# Patient Record
Sex: FEMALE | Race: WHITE | NOT HISPANIC OR LATINO | Employment: FULL TIME | URBAN - METROPOLITAN AREA
[De-identification: names, ages, dates, MRNs, and addresses within clinical notes are randomized per-mention and may not be internally consistent; named-entity substitution may affect disease eponyms.]

---

## 2018-10-29 ENCOUNTER — OFFICE VISIT CONVERTED (OUTPATIENT)
Dept: ORTHOPEDIC SURGERY | Facility: CLINIC | Age: 48
End: 2018-10-29
Attending: ORTHOPAEDIC SURGERY

## 2018-11-26 ENCOUNTER — CONVERSION ENCOUNTER (OUTPATIENT)
Dept: ORTHOPEDIC SURGERY | Facility: CLINIC | Age: 48
End: 2018-11-26

## 2018-11-26 ENCOUNTER — OFFICE VISIT CONVERTED (OUTPATIENT)
Dept: ORTHOPEDIC SURGERY | Facility: CLINIC | Age: 48
End: 2018-11-26
Attending: PHYSICIAN ASSISTANT

## 2018-12-07 ENCOUNTER — OFFICE VISIT CONVERTED (OUTPATIENT)
Dept: ORTHOPEDIC SURGERY | Facility: CLINIC | Age: 48
End: 2018-12-07
Attending: PHYSICIAN ASSISTANT

## 2019-01-28 ENCOUNTER — OFFICE VISIT CONVERTED (OUTPATIENT)
Dept: ORTHOPEDIC SURGERY | Facility: CLINIC | Age: 49
End: 2019-01-28
Attending: PHYSICIAN ASSISTANT

## 2019-07-03 ENCOUNTER — HOSPITAL ENCOUNTER (OUTPATIENT)
Dept: OTHER | Facility: HOSPITAL | Age: 49
Discharge: HOME | End: 2019-07-03
Attending: INTERNAL MEDICINE

## 2019-07-03 ENCOUNTER — CONVERSION ENCOUNTER (OUTPATIENT)
Dept: INTERNAL MEDICINE | Facility: CLINIC | Age: 49
End: 2019-07-03

## 2019-07-03 ENCOUNTER — OFFICE VISIT CONVERTED (OUTPATIENT)
Dept: INTERNAL MEDICINE | Facility: CLINIC | Age: 49
End: 2019-07-03
Attending: INTERNAL MEDICINE

## 2019-07-03 LAB
ALBUMIN SERPL-MCNC: 4.3 G/DL (ref 3.5–5)
ALBUMIN/GLOB SERPL: 1.7 {RATIO} (ref 1.4–2.6)
ALP SERPL-CCNC: 54 U/L (ref 42–98)
ALT SERPL-CCNC: 22 U/L (ref 10–40)
ANION GAP SERPL CALC-SCNC: 17 MMOL/L (ref 8–19)
AST SERPL-CCNC: 25 U/L (ref 15–50)
BASOPHILS # BLD AUTO: 0.04 10*3/UL (ref 0–0.2)
BASOPHILS NFR BLD AUTO: 0.9 % (ref 0–3)
BILIRUB SERPL-MCNC: 0.55 MG/DL (ref 0.2–1.3)
BUN SERPL-MCNC: 17 MG/DL (ref 5–25)
BUN/CREAT SERPL: 20 {RATIO} (ref 6–20)
CALCIUM SERPL-MCNC: 9.3 MG/DL (ref 8.7–10.4)
CHLORIDE SERPL-SCNC: 103 MMOL/L (ref 99–111)
CHOLEST SERPL-MCNC: 169 MG/DL (ref 107–200)
CHOLEST/HDLC SERPL: 2.6 {RATIO} (ref 3–6)
CONV ABS IMM GRAN: 0.01 10*3/UL (ref 0–0.2)
CONV CO2: 24 MMOL/L (ref 22–32)
CONV IMMATURE GRAN: 0.2 % (ref 0–1.8)
CONV TOTAL PROTEIN: 6.8 G/DL (ref 6.3–8.2)
CREAT UR-MCNC: 0.83 MG/DL (ref 0.5–0.9)
DEPRECATED RDW RBC AUTO: 49.5 FL (ref 36.4–46.3)
EOSINOPHIL # BLD AUTO: 0.08 10*3/UL (ref 0–0.7)
EOSINOPHIL # BLD AUTO: 1.7 % (ref 0–7)
ERYTHROCYTE [DISTWIDTH] IN BLOOD BY AUTOMATED COUNT: 13.4 % (ref 11.7–14.4)
GFR SERPLBLD BASED ON 1.73 SQ M-ARVRAT: >60 ML/MIN/{1.73_M2}
GLOBULIN UR ELPH-MCNC: 2.5 G/DL (ref 2–3.5)
GLUCOSE SERPL-MCNC: 92 MG/DL (ref 65–99)
HBA1C MFR BLD: 13.4 G/DL (ref 12–16)
HCT VFR BLD AUTO: 41.9 % (ref 37–47)
HDLC SERPL-MCNC: 65 MG/DL (ref 40–60)
LDLC SERPL CALC-MCNC: 93 MG/DL (ref 70–100)
LYMPHOCYTES # BLD AUTO: 1.83 10*3/UL (ref 1–5)
MCH RBC QN AUTO: 32.1 PG (ref 27–31)
MCHC RBC AUTO-ENTMCNC: 32 G/DL (ref 33–37)
MCV RBC AUTO: 100.2 FL (ref 81–99)
MONOCYTES # BLD AUTO: 0.4 10*3/UL (ref 0.2–1.2)
MONOCYTES NFR BLD AUTO: 8.6 % (ref 3–10)
NEUTROPHILS # BLD AUTO: 2.31 10*3/UL (ref 2–8)
NEUTROPHILS NFR BLD AUTO: 49.4 % (ref 30–85)
NRBC CBCN: 0 % (ref 0–0.7)
OSMOLALITY SERPL CALC.SUM OF ELEC: 291 MOSM/KG (ref 273–304)
PLATELET # BLD AUTO: 256 10*3/UL (ref 130–400)
PMV BLD AUTO: 9.6 FL (ref 9.4–12.3)
POTASSIUM SERPL-SCNC: 4.2 MMOL/L (ref 3.5–5.3)
RBC # BLD AUTO: 4.18 10*6/UL (ref 4.2–5.4)
SODIUM SERPL-SCNC: 140 MMOL/L (ref 135–147)
TRIGL SERPL-MCNC: 56 MG/DL (ref 40–150)
TSH SERPL-ACNC: 1.49 M[IU]/L (ref 0.27–4.2)
VARIANT LYMPHS NFR BLD MANUAL: 39.2 % (ref 20–45)
VLDLC SERPL-MCNC: 11 MG/DL (ref 5–37)
WBC # BLD AUTO: 4.67 10*3/UL (ref 4.8–10.8)

## 2019-08-22 ENCOUNTER — HOSPITAL ENCOUNTER (OUTPATIENT)
Dept: GENERAL RADIOLOGY | Facility: HOSPITAL | Age: 49
Discharge: HOME | End: 2019-08-22
Attending: INTERNAL MEDICINE

## 2020-08-26 ENCOUNTER — HOSPITAL ENCOUNTER (OUTPATIENT)
Dept: GENERAL RADIOLOGY | Facility: HOSPITAL | Age: 50
Discharge: HOME | End: 2020-08-26
Attending: OBSTETRICS & GYNECOLOGY

## 2020-08-26 ENCOUNTER — OFFICE VISIT CONVERTED (OUTPATIENT)
Dept: ORTHOPEDIC SURGERY | Facility: CLINIC | Age: 50
End: 2020-08-26
Attending: ORTHOPAEDIC SURGERY

## 2020-08-27 ENCOUNTER — HOSPITAL ENCOUNTER (OUTPATIENT)
Dept: MRI IMAGING | Facility: HOSPITAL | Age: 50
Discharge: HOME | End: 2020-08-27
Attending: ORTHOPAEDIC SURGERY

## 2020-09-02 ENCOUNTER — OFFICE VISIT CONVERTED (OUTPATIENT)
Dept: ORTHOPEDIC SURGERY | Facility: CLINIC | Age: 50
End: 2020-09-02
Attending: ORTHOPAEDIC SURGERY

## 2020-09-17 ENCOUNTER — OFFICE VISIT (OUTPATIENT)
Dept: ORTHOPEDIC SURGERY | Facility: CLINIC | Age: 50
End: 2020-09-17

## 2020-09-17 VITALS — WEIGHT: 170.1 LBS | BODY MASS INDEX: 28.34 KG/M2 | TEMPERATURE: 98 F | HEIGHT: 65 IN

## 2020-09-17 DIAGNOSIS — S83.242A OTHER TEAR OF MEDIAL MENISCUS OF LEFT KNEE AS CURRENT INJURY, INITIAL ENCOUNTER: ICD-10-CM

## 2020-09-17 DIAGNOSIS — M25.562 LEFT KNEE PAIN, UNSPECIFIED CHRONICITY: Primary | ICD-10-CM

## 2020-09-17 PROCEDURE — 99204 OFFICE O/P NEW MOD 45 MIN: CPT | Performed by: ORTHOPAEDIC SURGERY

## 2020-09-17 RX ORDER — CEFAZOLIN SODIUM 2 G/100ML
2 INJECTION, SOLUTION INTRAVENOUS ONCE
Status: CANCELLED | OUTPATIENT
Start: 2020-10-05 | End: 2020-09-17

## 2020-09-22 PROBLEM — S83.242A TEAR OF MEDIAL MENISCUS OF LEFT KNEE, CURRENT: Status: ACTIVE | Noted: 2020-09-22

## 2020-09-24 ENCOUNTER — TRANSCRIBE ORDERS (OUTPATIENT)
Dept: PREADMISSION TESTING | Facility: HOSPITAL | Age: 50
End: 2020-09-24

## 2020-09-24 DIAGNOSIS — Z01.818 OTHER SPECIFIED PRE-OPERATIVE EXAMINATION: Primary | ICD-10-CM

## 2020-09-29 ENCOUNTER — APPOINTMENT (OUTPATIENT)
Dept: PREADMISSION TESTING | Facility: HOSPITAL | Age: 50
End: 2020-09-29

## 2020-09-29 VITALS
RESPIRATION RATE: 16 BRPM | TEMPERATURE: 98.5 F | BODY MASS INDEX: 28.01 KG/M2 | HEART RATE: 54 BPM | SYSTOLIC BLOOD PRESSURE: 118 MMHG | WEIGHT: 168.1 LBS | DIASTOLIC BLOOD PRESSURE: 73 MMHG | OXYGEN SATURATION: 100 % | HEIGHT: 65 IN

## 2020-09-29 LAB
DEPRECATED RDW RBC AUTO: 40.1 FL (ref 37–54)
ERYTHROCYTE [DISTWIDTH] IN BLOOD BY AUTOMATED COUNT: 11.9 % (ref 12.3–15.4)
HCT VFR BLD AUTO: 41 % (ref 34–46.6)
HGB BLD-MCNC: 13.9 G/DL (ref 12–15.9)
MCH RBC QN AUTO: 31.2 PG (ref 26.6–33)
MCHC RBC AUTO-ENTMCNC: 33.9 G/DL (ref 31.5–35.7)
MCV RBC AUTO: 92.1 FL (ref 79–97)
PLATELET # BLD AUTO: 227 10*3/MM3 (ref 140–450)
PMV BLD AUTO: 9.4 FL (ref 6–12)
RBC # BLD AUTO: 4.45 10*6/MM3 (ref 3.77–5.28)
WBC # BLD AUTO: 4.06 10*3/MM3 (ref 3.4–10.8)

## 2020-09-29 PROCEDURE — 36415 COLL VENOUS BLD VENIPUNCTURE: CPT

## 2020-09-29 PROCEDURE — 85027 COMPLETE CBC AUTOMATED: CPT | Performed by: ORTHOPAEDIC SURGERY

## 2020-10-02 ENCOUNTER — LAB (OUTPATIENT)
Dept: LAB | Facility: HOSPITAL | Age: 50
End: 2020-10-02

## 2020-10-02 DIAGNOSIS — Z01.818 OTHER SPECIFIED PRE-OPERATIVE EXAMINATION: ICD-10-CM

## 2020-10-02 PROCEDURE — U0004 COV-19 TEST NON-CDC HGH THRU: HCPCS

## 2020-10-02 PROCEDURE — C9803 HOPD COVID-19 SPEC COLLECT: HCPCS

## 2020-10-03 LAB — SARS-COV-2 RNA RESP QL NAA+PROBE: NOT DETECTED

## 2020-10-05 ENCOUNTER — ANESTHESIA (OUTPATIENT)
Dept: PERIOP | Facility: HOSPITAL | Age: 50
End: 2020-10-05

## 2020-10-05 ENCOUNTER — ANESTHESIA EVENT (OUTPATIENT)
Dept: PERIOP | Facility: HOSPITAL | Age: 50
End: 2020-10-05

## 2020-10-05 ENCOUNTER — HOSPITAL ENCOUNTER (OUTPATIENT)
Facility: HOSPITAL | Age: 50
Setting detail: HOSPITAL OUTPATIENT SURGERY
Discharge: HOME | End: 2020-10-05
Attending: ORTHOPAEDIC SURGERY | Admitting: ORTHOPAEDIC SURGERY

## 2020-10-05 VITALS
TEMPERATURE: 98.1 F | SYSTOLIC BLOOD PRESSURE: 120 MMHG | OXYGEN SATURATION: 100 % | HEART RATE: 59 BPM | DIASTOLIC BLOOD PRESSURE: 67 MMHG | RESPIRATION RATE: 16 BRPM

## 2020-10-05 DIAGNOSIS — S83.242A OTHER TEAR OF MEDIAL MENISCUS OF LEFT KNEE AS CURRENT INJURY, INITIAL ENCOUNTER: ICD-10-CM

## 2020-10-05 PROCEDURE — 25010000002 METHYLPREDNISOLONE PER 80 MG: Performed by: ORTHOPAEDIC SURGERY

## 2020-10-05 PROCEDURE — 25010000002 FENTANYL CITRATE (PF) 100 MCG/2ML SOLUTION: Performed by: ANESTHESIOLOGY

## 2020-10-05 PROCEDURE — 25010000002 PROPOFOL 10 MG/ML EMULSION: Performed by: NURSE ANESTHETIST, CERTIFIED REGISTERED

## 2020-10-05 PROCEDURE — 25010000002 ONDANSETRON PER 1 MG: Performed by: NURSE ANESTHETIST, CERTIFIED REGISTERED

## 2020-10-05 PROCEDURE — 25010000002 MIDAZOLAM PER 1 MG: Performed by: ANESTHESIOLOGY

## 2020-10-05 PROCEDURE — 25010000003 CEFAZOLIN IN DEXTROSE 2-4 GM/100ML-% SOLUTION: Performed by: ORTHOPAEDIC SURGERY

## 2020-10-05 PROCEDURE — 25010000002 FENTANYL CITRATE (PF) 100 MCG/2ML SOLUTION: Performed by: NURSE ANESTHETIST, CERTIFIED REGISTERED

## 2020-10-05 PROCEDURE — 25010000002 DEXAMETHASONE PER 1 MG: Performed by: NURSE ANESTHETIST, CERTIFIED REGISTERED

## 2020-10-05 PROCEDURE — 29881 ARTHRS KNE SRG MNISECTMY M/L: CPT | Performed by: ORTHOPAEDIC SURGERY

## 2020-10-05 RX ORDER — FENTANYL CITRATE 50 UG/ML
50 INJECTION, SOLUTION INTRAMUSCULAR; INTRAVENOUS
Status: DISCONTINUED | OUTPATIENT
Start: 2020-10-05 | End: 2020-10-05 | Stop reason: HOSPADM

## 2020-10-05 RX ORDER — SODIUM CHLORIDE, SODIUM LACTATE, POTASSIUM CHLORIDE, AND CALCIUM CHLORIDE .6; .31; .03; .02 G/100ML; G/100ML; G/100ML; G/100ML
IRRIGANT IRRIGATION AS NEEDED
Status: DISCONTINUED | OUTPATIENT
Start: 2020-10-05 | End: 2020-10-05 | Stop reason: HOSPADM

## 2020-10-05 RX ORDER — PROPOFOL 10 MG/ML
VIAL (ML) INTRAVENOUS AS NEEDED
Status: DISCONTINUED | OUTPATIENT
Start: 2020-10-05 | End: 2020-10-05 | Stop reason: SURG

## 2020-10-05 RX ORDER — CEFAZOLIN SODIUM 2 G/100ML
2 INJECTION, SOLUTION INTRAVENOUS ONCE
Status: COMPLETED | OUTPATIENT
Start: 2020-10-05 | End: 2020-10-05

## 2020-10-05 RX ORDER — MIDAZOLAM HYDROCHLORIDE 1 MG/ML
1 INJECTION INTRAMUSCULAR; INTRAVENOUS
Status: DISCONTINUED | OUTPATIENT
Start: 2020-10-05 | End: 2020-10-05 | Stop reason: HOSPADM

## 2020-10-05 RX ORDER — DEXAMETHASONE SODIUM PHOSPHATE 10 MG/ML
INJECTION INTRAMUSCULAR; INTRAVENOUS AS NEEDED
Status: DISCONTINUED | OUTPATIENT
Start: 2020-10-05 | End: 2020-10-05 | Stop reason: SURG

## 2020-10-05 RX ORDER — SODIUM CHLORIDE 0.9 % (FLUSH) 0.9 %
3 SYRINGE (ML) INJECTION EVERY 12 HOURS SCHEDULED
Status: DISCONTINUED | OUTPATIENT
Start: 2020-10-05 | End: 2020-10-05 | Stop reason: HOSPADM

## 2020-10-05 RX ORDER — HYDROCODONE BITARTRATE AND ACETAMINOPHEN 7.5; 325 MG/1; MG/1
1 TABLET ORAL EVERY 4 HOURS PRN
Status: DISCONTINUED | OUTPATIENT
Start: 2020-10-05 | End: 2020-10-05 | Stop reason: HOSPADM

## 2020-10-05 RX ORDER — ONDANSETRON 2 MG/ML
4 INJECTION INTRAMUSCULAR; INTRAVENOUS ONCE AS NEEDED
Status: DISCONTINUED | OUTPATIENT
Start: 2020-10-05 | End: 2020-10-05 | Stop reason: HOSPADM

## 2020-10-05 RX ORDER — HYDROMORPHONE HYDROCHLORIDE 1 MG/ML
0.5 INJECTION, SOLUTION INTRAMUSCULAR; INTRAVENOUS; SUBCUTANEOUS
Status: DISCONTINUED | OUTPATIENT
Start: 2020-10-05 | End: 2020-10-05 | Stop reason: HOSPADM

## 2020-10-05 RX ORDER — SODIUM CHLORIDE 0.9 % (FLUSH) 0.9 %
3-10 SYRINGE (ML) INJECTION AS NEEDED
Status: DISCONTINUED | OUTPATIENT
Start: 2020-10-05 | End: 2020-10-05 | Stop reason: HOSPADM

## 2020-10-05 RX ORDER — ONDANSETRON 2 MG/ML
INJECTION INTRAMUSCULAR; INTRAVENOUS AS NEEDED
Status: DISCONTINUED | OUTPATIENT
Start: 2020-10-05 | End: 2020-10-05 | Stop reason: SURG

## 2020-10-05 RX ORDER — FENTANYL CITRATE 50 UG/ML
INJECTION, SOLUTION INTRAMUSCULAR; INTRAVENOUS AS NEEDED
Status: DISCONTINUED | OUTPATIENT
Start: 2020-10-05 | End: 2020-10-05 | Stop reason: SURG

## 2020-10-05 RX ORDER — METHYLPREDNISOLONE ACETATE 80 MG/ML
INJECTION, SUSPENSION INTRA-ARTICULAR; INTRALESIONAL; INTRAMUSCULAR; SOFT TISSUE
Status: DISCONTINUED
Start: 2020-10-05 | End: 2020-10-05 | Stop reason: HOSPADM

## 2020-10-05 RX ORDER — LIDOCAINE HYDROCHLORIDE 20 MG/ML
INJECTION, SOLUTION INFILTRATION; PERINEURAL AS NEEDED
Status: DISCONTINUED | OUTPATIENT
Start: 2020-10-05 | End: 2020-10-05 | Stop reason: SURG

## 2020-10-05 RX ORDER — ENALAPRILAT 2.5 MG/2ML
0.62 INJECTION INTRAVENOUS ONCE AS NEEDED
Status: DISCONTINUED | OUTPATIENT
Start: 2020-10-05 | End: 2020-10-05 | Stop reason: HOSPADM

## 2020-10-05 RX ORDER — HYDROCODONE BITARTRATE AND ACETAMINOPHEN 5; 325 MG/1; MG/1
1 TABLET ORAL EVERY 4 HOURS PRN
Qty: 40 TABLET | Refills: 0 | Status: SHIPPED | OUTPATIENT
Start: 2020-10-05 | End: 2020-10-15

## 2020-10-05 RX ORDER — SODIUM CHLORIDE, SODIUM LACTATE, POTASSIUM CHLORIDE, CALCIUM CHLORIDE 600; 310; 30; 20 MG/100ML; MG/100ML; MG/100ML; MG/100ML
9 INJECTION, SOLUTION INTRAVENOUS CONTINUOUS
Status: DISCONTINUED | OUTPATIENT
Start: 2020-10-05 | End: 2020-10-05 | Stop reason: HOSPADM

## 2020-10-05 RX ORDER — SCOLOPAMINE TRANSDERMAL SYSTEM 1 MG/1
1 PATCH, EXTENDED RELEASE TRANSDERMAL CONTINUOUS
Status: DISCONTINUED | OUTPATIENT
Start: 2020-10-05 | End: 2020-10-05 | Stop reason: HOSPADM

## 2020-10-05 RX ORDER — LIDOCAINE HYDROCHLORIDE 10 MG/ML
0.5 INJECTION, SOLUTION EPIDURAL; INFILTRATION; INTRACAUDAL; PERINEURAL ONCE AS NEEDED
Status: DISCONTINUED | OUTPATIENT
Start: 2020-10-05 | End: 2020-10-05 | Stop reason: HOSPADM

## 2020-10-05 RX ADMIN — MIDAZOLAM 1 MG: 1 INJECTION INTRAMUSCULAR; INTRAVENOUS at 09:00

## 2020-10-05 RX ADMIN — ONDANSETRON HYDROCHLORIDE 4 MG: 2 SOLUTION INTRAMUSCULAR; INTRAVENOUS at 09:53

## 2020-10-05 RX ADMIN — SCOPALAMINE 1 PATCH: 1 PATCH, EXTENDED RELEASE TRANSDERMAL at 07:16

## 2020-10-05 RX ADMIN — FENTANYL CITRATE 50 MCG: 50 INJECTION, SOLUTION INTRAMUSCULAR; INTRAVENOUS at 10:45

## 2020-10-05 RX ADMIN — FENTANYL CITRATE 25 MCG: 50 INJECTION INTRAMUSCULAR; INTRAVENOUS at 09:39

## 2020-10-05 RX ADMIN — LIDOCAINE HYDROCHLORIDE 80 MG: 20 INJECTION, SOLUTION INFILTRATION; PERINEURAL at 09:21

## 2020-10-05 RX ADMIN — DEXAMETHASONE SODIUM PHOSPHATE 8 MG: 10 INJECTION INTRAMUSCULAR; INTRAVENOUS at 09:29

## 2020-10-05 RX ADMIN — CEFAZOLIN SODIUM 2 G: 2 INJECTION, SOLUTION INTRAVENOUS at 09:25

## 2020-10-05 RX ADMIN — SODIUM CHLORIDE, POTASSIUM CHLORIDE, SODIUM LACTATE AND CALCIUM CHLORIDE 9 ML/HR: 600; 310; 30; 20 INJECTION, SOLUTION INTRAVENOUS at 07:06

## 2020-10-05 RX ADMIN — FENTANYL CITRATE 50 MCG: 50 INJECTION INTRAMUSCULAR; INTRAVENOUS at 09:21

## 2020-10-05 RX ADMIN — HYDROCODONE BITARTRATE AND ACETAMINOPHEN 1 TABLET: 7.5; 325 TABLET ORAL at 10:38

## 2020-10-05 RX ADMIN — FENTANYL CITRATE 50 MCG: 50 INJECTION, SOLUTION INTRAMUSCULAR; INTRAVENOUS at 10:24

## 2020-10-05 RX ADMIN — FENTANYL CITRATE 25 MCG: 50 INJECTION INTRAMUSCULAR; INTRAVENOUS at 09:36

## 2020-10-05 RX ADMIN — PROPOFOL 150 MG: 10 INJECTION, EMULSION INTRAVENOUS at 09:21

## 2020-10-05 NOTE — ANESTHESIA POSTPROCEDURE EVALUATION
Patient: Priscilla Casanova    Procedure Summary     Date: 10/05/20 Room / Location:  [facility]  [facility]    Anesthesia Start: 0915 Anesthesia Stop: 1006    Procedure: KNEE ARTHROSCOPY, PARTIAL MEDIAL MENISECTOMMY AND DEBRIDEMENT OF PATELLA (Left Knee) Diagnosis:       Other tear of medial meniscus of left knee as current injury, initial encounter      (Other tear of medial meniscus of left knee as current injury, initial encounter [S83.242A])    Surgeon: Ila Dickerson MD Provider: Tuan Gorman MD    Anesthesia Type: general ASA Status: 1          Anesthesia Type: general    Vitals  Vitals Value Taken Time   /73 10/05/20 1045   Temp 36.7 °C (98.1 °F) 10/05/20 1045   Pulse 55 10/05/20 1055   Resp 16 10/05/20 1045   SpO2 98 % 10/05/20 1055   Vitals shown include unvalidated device data.        Post Anesthesia Care and Evaluation    Patient location during evaluation: bedside  Patient participation: complete - patient participated  Level of consciousness: awake and alert  Pain management: adequate  Airway patency: patent  Anesthetic complications: No anesthetic complications  PONV Status: controlled  Cardiovascular status: blood pressure returned to baseline and acceptable  Respiratory status: acceptable  Hydration status: acceptable

## 2020-10-05 NOTE — ANESTHESIA PROCEDURE NOTES
Airway  Urgency: elective    Date/Time: 10/5/2020 9:22 AM  Airway not difficult    General Information and Staff    Patient location during procedure: OR  Anesthesiologist: Tuan Gorman MD  CRNA: Micheline Mendoza CRNA    Indications and Patient Condition  Indications for airway management: airway protection    Preoxygenated: yes  MILS maintained throughout  Mask difficulty assessment: 1 - vent by mask    Final Airway Details  Final airway type: supraglottic airway      Successful airway: classic  Size 4    Number of attempts at approach: 1  Assessment: lips, teeth, and gum same as pre-op and atraumatic intubation

## 2020-10-05 NOTE — ANESTHESIA PREPROCEDURE EVALUATION
Anesthesia Evaluation     Patient summary reviewed and Nursing notes reviewed   NPO Solid Status: > 8 hours  NPO Liquid Status: > 2 hours           Airway   Mallampati: II  TM distance: >3 FB  Neck ROM: full  Dental - normal exam     Pulmonary - negative pulmonary ROS and normal exam    breath sounds clear to auscultation  Cardiovascular - negative cardio ROS and normal exam    Rhythm: regular  Rate: normal    (-) angina, orthopnea, PND, YI      Neuro/Psych- negative ROS  GI/Hepatic/Renal/Endo - negative ROS     Musculoskeletal     Abdominal    Substance History - negative use     OB/GYN negative ob/gyn ROS         Other   arthritis,                      Anesthesia Plan    ASA 1     general   (I have reviewed the patient's history with the patient and the chart, including all pertinent laboratory results and imaging. I have explained the risks of anesthesia including but not limited to dental damage, corneal abrasion, nerve injury, MI, stroke, and death.)  intravenous induction     Anesthetic plan, all risks, benefits, and alternatives have been provided, discussed and informed consent has been obtained with: patient.

## 2020-10-05 NOTE — H&P
History & Physical       Patient: Priscilla Casanova    Date of Admission: 10/5/2020  5:58 AM    YOB: 1970    Medical Record Number: [id number]    Attending Physician: Ila Dickerson MD        Chief Complaints: Other tear of medial meniscus of left knee as current injury, initial encounter [S83.242A]      History of Present Illness: Patient is several month history of left knee pain she has an MRI which shows posterior horn medial meniscus and some chondromalacia patella.  She presents for arthroscopy     Allergies: No Known Allergies    Medications:   Home Medications:  No current facility-administered medications on file prior to encounter.      Current Outpatient Medications on File Prior to Encounter   Medication Sig   • Chlorcyclizine-Pseudoephed (STAHIST AD) 25-60 MG tablet Take 1 tablet by mouth Every 8 (Eight) Hours As Needed (congestion).     Current Medications:  Scheduled Meds:ceFAZolin, 2 g, Intravenous, Once  methylPREDNISolone acetate, , ,   sodium chloride, 3 mL, Intravenous, Q12H      Continuous Infusions:lactated ringers, 9 mL/hr, Last Rate: 9 mL/hr (10/05/20 0706)  Scopolamine, 1 patch      PRN Meds:.fentanyl  •  lidocaine PF 1%  •  midazolam  •  sodium chloride    Past Medical History:   Diagnosis Date   • Arthritis     LT KNEE   • Limited joint range of motion     LEFT   • Seasonal allergies    • Torn medial meniscus 07/2020    LT KNEE        Past Surgical History:   Procedure Laterality Date   • CERVICAL BIOPSY  W/ LOOP ELECTRODE EXCISION  2005   • WISDOM TOOTH EXTRACTION          Social History     Occupational History   • Not on file   Tobacco Use   • Smoking status: Never Smoker   • Smokeless tobacco: Never Used   Substance and Sexual Activity   • Alcohol use: No     Frequency: Never   • Drug use: Defer   • Sexual activity: Defer      Social History     Social History Narrative   • Not on file        Family History   Problem Relation Age of Onset   • Malig Hyperthermia Neg  Hx        Review of Systems      Physical Exam: 50 y.o. female  General Appearance:    Alert, cooperative, in no acute distress                      Vitals:    10/05/20 0645   BP: 122/88   BP Location: Right arm   Patient Position: Lying   Pulse: 65   Resp: 16   Temp: 98.8 °F (37.1 °C)   TempSrc: Oral   SpO2: 98%        Head:    Normocephalic, without obvious abnormality, atraumatic   Eyes:            conjunctivae and sclerae normal, no pallor, corneas clear,    Ears:    Ears appear intact with no abnormalities noted   Throat:   No oral lesions, no thrush, oral mucosa moist   Neck:   No adenopathy, supple, trachea midline, no thyromegaly,    Back:     No kyphosis present, no scoliosis present, no skin lesions,      erythema or scars, no tenderness to percussion or                   palpation,   range of motion normal   Lungs:     Clear to auscultation,respirations regular, even and                  unlabored    Heart:    Regular rhythm and normal rate               Chest Wall:    No abnormalities observed   Abdomen:     Normal bowel sounds, no masses, no organomegaly, soft        non-tender, non-distended, no guarding, no rebound                tenderness   Rectal:     Deferred   Extremities:    Moves all extremities well, no edema,   no cyanosis, no redness   Pulses:   Pulses palpable and equal bilaterally   Skin:   No bleeding, bruising or rash   Lymph nodes:   No palpable adenopathy   Neurologic:   Appears neurologic intact             Assessment:  Patient Active Problem List   Diagnosis   • Tear of medial meniscus of left knee, current           Plan: All risks, benefits and alternatives were discussed.  Risks including to but not exclusive to anesthetic complications, including death, MI, CVA, infection, bleeding DVT, PE,  fracture, residual pain and need for future surgery.  Patient understood all and agrees to proceed.

## 2020-10-05 NOTE — OP NOTE
Operative Note      Facility: [facility]  Patient Name: Priscilla Casanova  YOB: 1970  Date: 10/5/2020  Medical Record Number: [id number]      Pre-op Diagnosis:   Other tear of medial meniscus of left knee as current injury, initial encounter [S83.242A]    Post-Op Diagnosis Codes:     * Other tear of medial meniscus of left knee as current injury, initial encounter [S83.242A]  Complex tear medial meniscus involving the body and the posterior horn, grade 2 changes on the patella  Procedure(s):  KNEE ARTHROSCOPY, PARTIAL MEDIAL MENISECTOMMY AND DEBRIDEMENT OF PATELLA    Surgeon(s):  Ila Dickerson MD    Anesthesia: General  Anesthesiologist: Tuan Gorman MD  CRNA: Micheline Mendoza CRNA    Staff:   Circulator: Caryn Singletary RN  Scrub Person: Brittanie Tai    Assistants : none      Estimated Blood Loss: 5 cc    Specimens:    none     Drains: None    Findings: See Dictation    Complications: None      Indication for procedure: This patient has had a several month history of knee pain and has an exam and an MRI which are consistent with meniscal pathology. They understand all options and wished to proceed with arthroscopy.      Description of procedure: The patient was taken to the operating room. They were placed supine on the operating room table. After induction of adequate LMA anesthesia, IV antibiotics the underwent exam under anesthesia was symmetric full range of motion. Nonsterile tourniquet was applied patient was placed in the thigh tran all prominent areas well padded and into the table dropped. The leg was prepped and draped in usual sterile fashion. Standard lateral incision was made with 11 blade. Blunt trocar penetrated into the joint scope followed in the evaluation began the patella peer to sit centrally within the trochlear groove the cartilage on the trochlear groove was normal on the patella she did have some grade 2 changes the gutters were normal.  I  then entered the medial compartment under spinal needle localization direct visualization a medial portal was established.  She had a complex tear of the medial meniscus involving the body and the posterior horn.  This was resected with various angles biters baskets motorized allegra and ultimately the Elgin device back to a nice stable edge.  The cartilage on the femur and the tibia were normal.  I then evaluate the notch the ACL PCL were intact.  I then entered the lateral compartment she had some very mild fraying of the central aspect the lateral meniscus but essentially all this was normal.  I then turned my attention patellofemoral joint gently debrided patella             At this point everything was thoroughly irrigated it was suctioned all 3 compartments, the gutters the suprapatellar pouch were all evaluated there was no further acute pathology seen.  Everything was thoroughly irrigated it was injected with Marcaine Depo-Medrol.  The portals were closed with 3-0 nylon interrupted fashion.  Sterile dressings and Ace wraps were applied.  The patient tolerated the procedure well and was taken to recovery room in good condition.  All sponge and needle count were correct                    Date: 10/5/2020  Time: 10:21 EDT

## 2020-10-08 ENCOUNTER — TELEPHONE (OUTPATIENT)
Dept: ORTHOPEDIC SURGERY | Facility: CLINIC | Age: 50
End: 2020-10-08

## 2020-10-08 NOTE — TELEPHONE ENCOUNTER
PATIENT CALLED IN ASKING FOR A DRS NOTE FROM DR. MOSQUERA TO BE EMAILED TO CONTACT BELOW FOR WORK.    ALF - LANDON     EMAIL - [email or web address]

## 2020-10-14 NOTE — PROGRESS NOTES
Left Knee Scope follow Up 1st Visit      Patient: Priscilla Casanova        YOB: 1970      Chief Complaints: Left knee pain      History of Present Illness: Pt is here f/u knee arthroscopy she states she doing good feels a lot better progressing her activities        Allergies: No Known Allergies    Medications:   Home Medications:  Current Outpatient Medications on File Prior to Visit   Medication Sig   • Chlorcyclizine-Pseudoephed (STAHIST AD) 25-60 MG tablet Take 1 tablet by mouth Every 8 (Eight) Hours As Needed (congestion).   • HYDROcodone-acetaminophen (NORCO) 5-325 MG per tablet Take 1 tablet by mouth Every 4 (Four) Hours As Needed for Severe Pain .     No current facility-administered medications on file prior to visit.      Current Medications:  Scheduled Meds:  Continuous Infusions:No current facility-administered medications for this visit.     PRN Meds:.          Physical Exam: 50 y.o. female  General Appearance:    Alert, cooperative, in no acute distress                 There were no vitals filed for this visit.   Patient is alert and oriented ×3 no acute distress normal mood physical exam.  Physical exam of the knee, incisions looked good there is no erythema, calf is soft and non-tender.  No sign or sx of DVT      Assessment  S/P knee scope.  I did review intraoperative findings and arthroscopic pictures with the patient.          Plan: To remove sutures today place Steri-Strips and start into  physical therapy and I will have thrm follow up in 4 weeks.  I will have her take some anti-inflammatories have her see physical therapy to improve her quad and core strength try to get her back to the activities that she wants

## 2020-10-15 ENCOUNTER — OFFICE VISIT (OUTPATIENT)
Dept: ORTHOPEDIC SURGERY | Facility: CLINIC | Age: 50
End: 2020-10-15

## 2020-10-15 VITALS — TEMPERATURE: 97.8 F | HEIGHT: 65 IN | WEIGHT: 168.4 LBS | BODY MASS INDEX: 28.06 KG/M2

## 2020-10-15 DIAGNOSIS — Z98.890 STATUS POST LABRAL REPAIR OF SHOULDER: Primary | ICD-10-CM

## 2020-10-15 PROCEDURE — 99024 POSTOP FOLLOW-UP VISIT: CPT | Performed by: ORTHOPAEDIC SURGERY

## 2020-10-15 RX ORDER — MELOXICAM 15 MG/1
TABLET ORAL
Qty: 30 TABLET | Refills: 0 | Status: SHIPPED | OUTPATIENT
Start: 2020-10-15 | End: 2022-03-28

## 2021-03-26 ENCOUNTER — BULK ORDERING (OUTPATIENT)
Dept: CASE MANAGEMENT | Facility: OTHER | Age: 51
End: 2021-03-26

## 2021-03-26 DIAGNOSIS — Z23 IMMUNIZATION DUE: ICD-10-CM

## 2021-04-29 ENCOUNTER — OFFICE VISIT (OUTPATIENT)
Dept: ORTHOPEDIC SURGERY | Facility: CLINIC | Age: 51
End: 2021-04-29

## 2021-04-29 VITALS — TEMPERATURE: 96.4 F | BODY MASS INDEX: 28.82 KG/M2 | HEIGHT: 65 IN | WEIGHT: 173 LBS

## 2021-04-29 DIAGNOSIS — M17.12 PRIMARY LOCALIZED OSTEOARTHROSIS OF LEFT LOWER LEG: ICD-10-CM

## 2021-04-29 DIAGNOSIS — M25.562 LEFT KNEE PAIN, UNSPECIFIED CHRONICITY: Primary | ICD-10-CM

## 2021-04-29 PROCEDURE — 73562 X-RAY EXAM OF KNEE 3: CPT | Performed by: ORTHOPAEDIC SURGERY

## 2021-04-29 PROCEDURE — 99213 OFFICE O/P EST LOW 20 MIN: CPT | Performed by: ORTHOPAEDIC SURGERY

## 2021-04-29 PROCEDURE — 20610 DRAIN/INJ JOINT/BURSA W/O US: CPT | Performed by: ORTHOPAEDIC SURGERY

## 2021-04-29 RX ORDER — METHYLPREDNISOLONE ACETATE 80 MG/ML
80 INJECTION, SUSPENSION INTRA-ARTICULAR; INTRALESIONAL; INTRAMUSCULAR; SOFT TISSUE
Status: COMPLETED | OUTPATIENT
Start: 2021-04-29 | End: 2021-04-29

## 2021-04-29 RX ORDER — LIDOCAINE HYDROCHLORIDE 20 MG/ML
4 INJECTION, SOLUTION EPIDURAL; INFILTRATION; INTRACAUDAL; PERINEURAL
Status: COMPLETED | OUTPATIENT
Start: 2021-04-29 | End: 2021-04-29

## 2021-04-29 RX ADMIN — METHYLPREDNISOLONE ACETATE 80 MG: 80 INJECTION, SUSPENSION INTRA-ARTICULAR; INTRALESIONAL; INTRAMUSCULAR; SOFT TISSUE at 16:20

## 2021-04-29 RX ADMIN — LIDOCAINE HYDROCHLORIDE 4 ML: 20 INJECTION, SOLUTION EPIDURAL; INFILTRATION; INTRACAUDAL; PERINEURAL at 16:20

## 2021-04-29 NOTE — PROGRESS NOTES
Left Knee Scope follow Up       Patient: Priscilla Casanova        YOB: 1970      Chief Complaints: left knee pain      History of Present Illness: Pt is here f/u knee arthroscopy she still has some pain with activity particular with running she had her knee scoped at the end of the summer last year she states she still has pain if she is up on it pain is primarily medial it does feel a little stiff at times really have a hard time running but even walking for a period of time bothers her symptoms are moderate intermittent aching with swelling worse with activity somewhat better with rest her past medical history is unchanged medications are unchanged        Allergies: No Known Allergies    Medications:   Home Medications:  Current Outpatient Medications on File Prior to Visit   Medication Sig   • Chlorcyclizine-Pseudoephed (STAHIST AD) 25-60 MG tablet Take 1 tablet by mouth Every 8 (Eight) Hours As Needed (congestion).   • meloxicam (MOBIC) 15 MG tablet 1 PO Daily with food.     No current facility-administered medications on file prior to visit.     Current Medications:  Scheduled Meds:  Continuous Infusions:No current facility-administered medications for this visit.    PRN Meds:.          Physical Exam: 50 y.o. female  General Appearance:    Alert, cooperative, in no acute distress                 There were no vitals filed for this visit.   Patient is alert and oriented ×3 no acute distress normal mood physical exam.  Physical exam of the knee, incisions looked good there is no erythema, calf is soft and non-tender.  No sign or sx of DVT has a negative bounce home negative very stable diminished exam    X-rays AP lateral merchant view of the left knee were taken to evaluate her joint space and compared to x-rays done preoperatively she does have some narrowing of the medial compartment a little over 50% of her joint space remaining but definitely more narrow than last  Assessment  S/P knee scope.   Overall doing ok with still some symptoms with increased activity we did review her scope pictures she does not have a tiny meniscus left she did have some degenerative changes I suspect this is more degenerative in origin    Plan: Continue with strengthening, progression of activities think an injection is reasonable we talked about the importance of activity such as swimming and biking that were not as tough on the knee    Large Joint Arthrocentesis: L knee  Date/Time: 4/29/2021 4:20 PM  Consent given by: patient  Site marked: site marked  Timeout: Immediately prior to procedure a time out was called to verify the correct patient, procedure, equipment, support staff and site/side marked as required   Supporting Documentation  Indications: pain   Procedure Details  Location: knee - L knee  Preparation: Patient was prepped and draped in the usual sterile fashion  Needle gauge: 21G.  Approach: anteromedial  Medications administered: 4 mL lidocaine PF 2% 2 %; 80 mg methylPREDNISolone acetate 80 MG/ML  Patient tolerance: patient tolerated the procedure well with no immediate complications

## 2021-05-10 NOTE — H&P
History and Physical      Patient Name: Priscilla Casanova   Patient ID: [id number]   Sex: Female   YOB: 1970    Primary Care Provider: Andrew Gonzalez MD    Visit Date: August 26, 2020    Provider: Davidson Moreno MD   Location: [facility]   Location Address: [address] [place] [address]  [id number]   Location Phone: [phone]          Chief Complaint  · Left Knee Pain      History Of Present Illness  Priscilla Casanova is a 49 year old /White female who presents today for evaluation of left knee pain that started about 3-4 weeks ago. She runs, and it is getting so bad now that she is limping and it is causing her some back pain.       Past Medical History  Anemia; Broken Bones; Cervical cancer screening; GERD (gastroesophageal reflux disease); Screening for breast cancer; Screening Mammogram; Seasonal allergies; STD (female)         Past Surgical History  Cervical Surgery; Colonoscopy; EGD         Allergy List  NO KNOWN DRUG ALLERGIES         Family Medical History  Esophagus Neoplasm, Malignant; Cancer, Unspecified; Diabetes, unspecified type; Osteoporosis         Social History  Alcohol (Never); Alcohol Use (Current some day); lives with spouse; .; Recreational Drug Use (Never); Tobacco (Never); Working         Review of Systems  · Constitutional  o Denies  o : fever, chills, weight loss  · Cardiovascular  o Denies  o : chest pain, shortness of breath  · Gastrointestinal  o Denies  o : liver disease, heartburn, nausea, blood in stools  · Genitourinary  o Denies  o : painful urination, blood in urine  · Integument  o Denies  o : rash, itching  · Neurologic  o Denies  o : headache, weakness, loss of consciousness  · Musculoskeletal  o Admits  o : painful, swollen joints  · Psychiatric  o Denies  o : drug/alcohol addiction, anxiety, depression      Vitals  Date Time BP Position Site L\R Cuff Size HR RR TEMP (F) WT  HT  BMI kg/m2 BSA m2 O2 Sat HC       08/26/2020 10:31 AM      " 66 - R   168lbs 0oz 5'  5\" 27.96 1.87 99 %          Physical Examination  · Constitutional  o Appearance  o : well developed, well-nourished, no obvious deformities present  · Head and Face  o Head  o :   § Inspection  § : normocephalic  o Face  o :   § Inspection  § : no facial lesions  · Eyes  o Conjunctivae  o : conjunctivae normal  o Sclerae  o : sclerae white  · Ears, Nose, Mouth and Throat  o Ears  o :   § External Ears  § : appearance within normal limits  § Hearing  § : intact  o Nose  o :   § External Nose  § : appearance normal  · Neck  o Inspection/Palpation  o : normal appearance  o Range of Motion  o : full range of motion  · Respiratory  o Respiratory Effort  o : breathing unlabored  o Inspection of Chest  o : normal appearance  o Auscultation of Lungs  o : no audible wheezing or rales  · Cardiovascular  o Heart  o : regular rate  · Gastrointestinal  o Abdominal Examination  o : soft and non-tender  · Skin and Subcutaneous Tissue  o General Inspection  o : intact, no rashes  · Psychiatric  o General  o : Alert and oriented x3  o Judgement and Insight  o : judgment and insight intact  o Mood and Affect  o : mood normal, affect appropriate  · Left Knee  o Inspection  o : Normal-appearing knee compared bilaterally. No skin discoloration, atrophy, or swelling. Exquisite tenderness over the medial joint line. Nontender lateral joint line. Positive Sona's. Positive Apley's. Negative Lachman. Negative Posterior Sag. Stable to Varus/Valgus Stress. Good strength of quadriceps, hamstrings, dorsiflexors, and plantar flexors. Sensation grossly intact. Neurovascularly intact. Calf supple; no signs of DVT.   · In Office Procedures  o View  o : LAT/SUNRISE/STANDING   o Site  o : left knee  o Indication  o : Left knee pain   o Study  o : X-rays ordered, taken in the office, and reviewed today.  o Xray  o : Negative.  o Comparative Data  o : No comparative data found          Assessment  · Left knee medial " meniscus tear     836.0/S83.249A  · Left knee pain, unspecified chronicity     719.46/M25.562      Plan  · Orders  o Knee (Left) [facility] Preferred View ([id number]) - 719.46/M25.562 - 08/26/2020  · Medications  o Medications have been Reconciled  o Transition of Care or Provider Policy  · Instructions  o Reviewed the patient's Past Medical, Social, and Family history as well as the ROS at today's visit, no changes.  o Call or return if worsening symptoms.  o This note was transcribed by Johanne judd.  o Discussed getting an MRI, as this is affecting her exercise and she is getting a significant limp. Follow up after left knee MRI.             Electronically Signed by: Johanne Gordon-, Other -Author on August 27, 2020 08:53:58 PM  Electronically Co-signed by: Davidson Moreno MD -Reviewer on August 27, 2020 09:55:32 PM

## 2021-05-13 NOTE — PROGRESS NOTES
Progress Note      Patient Name: Priscilla Casanova   Patient ID: [id number]   Sex: Female   YOB: 1970    Primary Care Provider: Andrew Gonzalez MD    Visit Date: September 2, 2020    Provider: Davidson Moreno MD   Location: [facility]   Location Address: [address] [place] [address]  [id number]   Location Phone: [phone]          Chief Complaint  · Left knee pain       History Of Present Illness  Priscilla Casanova is a 49 year old /White female who presents today to [facility].      Patient presents today with a follow-up for left knee pain. Patient is a runner and it is getting so bad now that she is limping and it is causing her some back pain. Patient presents today with MRI results. Patient states that the pain is still causing her to limp since her last visit but has been trying to relax her left knee. Pain has been going on for a month.                   Past Medical History  Anemia; Broken Bones; Cervical cancer screening; GERD (gastroesophageal reflux disease); Screening for breast cancer; Screening Mammogram; Seasonal allergies; STD (female)         Past Surgical History  Cervical Surgery; Colonoscopy; EGD         Allergy List  NO KNOWN DRUG ALLERGIES       Allergies Reconciled  Family Medical History  Esophagus Neoplasm, Malignant; Cancer, Unspecified; Diabetes, unspecified type; Osteoporosis         Social History  Alcohol (Never); Alcohol Use (Current some day); lives with spouse; .; Recreational Drug Use (Never); Tobacco (Never); Working         Immunizations  Name Date Admin   Hepatitis A    Influenza          Review of Systems  · Constitutional  o Denies  o : fever, chills, weight loss  · Cardiovascular  o Denies  o : chest pain, shortness of breath  · Gastrointestinal  o Denies  o : liver disease, heartburn, nausea, blood in stools  · Genitourinary  o Denies  o : painful urination, blood in urine  · Integument  o Denies  o : rash,  "itching  · Neurologic  o Denies  o : headache, weakness, loss of consciousness  · Musculoskeletal  o Denies  o : painful, swollen joints  · Psychiatric  o Denies  o : drug/alcohol addiction, anxiety, depression      Vitals  Date Time BP Position Site L\R Cuff Size HR RR TEMP (F) WT  HT  BMI kg/m2 BSA m2 O2 Sat HC       09/02/2020 02:24 PM      68 - R   170lbs 2oz 5'  6\" 27.46 1.9 98 %          Physical Examination  · Constitutional  o Appearance  o : well developed, well-nourished, no obvious deformities present  · Head and Face  o Head  o :   § Inspection  § : normocephalic  o Face  o :   § Inspection  § : no facial lesions  · Eyes  o Conjunctivae  o : conjunctivae normal  o Sclerae  o : sclerae white  · Ears, Nose, Mouth and Throat  o Ears  o :   § External Ears  § : appearance within normal limits  § Hearing  § : intact  o Nose  o :   § External Nose  § : appearance normal  · Neck  o Inspection/Palpation  o : normal appearance  o Range of Motion  o : full range of motion  · Respiratory  o Respiratory Effort  o : breathing unlabored  o Inspection of Chest  o : normal appearance  o Auscultation of Lungs  o : no audible wheezing or rales  · Cardiovascular  o Heart  o : regular rate  · Gastrointestinal  o Abdominal Examination  o : soft and non-tender  · Skin and Subcutaneous Tissue  o General Inspection  o : intact, no rashes  · Psychiatric  o General  o : Alert and oriented x3  o Judgement and Insight  o : judgment and insight intact  o Mood and Affect  o : mood normal, affect appropriate  · Left Knee  o Inspection  o : Normal-appearing knee compared bilaterally. No skin discoloration, atrophy, or swelling. Exquisite tenderness over the medial joint line. Nontender lateral joint line. Positive Sona's. Positive Apley's. Negative Lachman. Negative Posterior Sag. Stable to Varus/Valgus Stress. Good strength of quadriceps, hamstrings, dorsiflexors, and plantar flexors. Sensation grossly intact. Neurovascular " intact. Calf supple; no signs of DVT.   · Imaging  o Imaging  o : MRI: 1. Horizontal oblique tear of the posterior horn of the medial meniscus. Radial tear of the free edge of the posterior meniscus 2. MCL bursitis 3. Moderate suprapatellar effusion. Small popliteal cyst. 4. Moderate patellar chondromalacia               Assessment  · MMT (medial meniscus tear): Left     836.0/S83.249A  · Left knee pain, unspecified chronicity     719.46/M25.562      Plan  · Medications  o Medications have been Reconciled  o Transition of Care or Provider Policy  · Instructions  o Dr. Moreno saw and examined the patient and agrees with plan.   o X-rays reviewed by Dr. Moreno.  o Reviewed the patient's Past Medical, Social, and Family history as well as the ROS at today's visit, no changes.  o Call or return if worsening symptoms.  o Discussed surgery.  o Risks/benefits discussed with patient including, but not limited to: infection, bleeding, neurovascular damage, malunion, nonunion, aesthetic deformity, need for further surgery, and death.  o Surgery pamphlet given.  o This note was transcribed by Karmen Anthony. dutch  o Discussed diagnosis and treatment options with the patient. Patient opted for a arthroscopic repair surgery on her left knee for MMT.            Electronically Signed by: Karmen Anthony-, Other -Author on September 4, 2020 09:18:20 AM  Electronically Co-signed by: Davidson Moreno MD -Reviewer on September 4, 2020 09:54:14 PM

## 2021-05-14 VITALS — WEIGHT: 168 LBS | BODY MASS INDEX: 27.99 KG/M2 | OXYGEN SATURATION: 99 % | HEIGHT: 65 IN | HEART RATE: 66 BPM

## 2021-05-14 VITALS — BODY MASS INDEX: 27.34 KG/M2 | HEIGHT: 66 IN | HEART RATE: 68 BPM | WEIGHT: 170.12 LBS | OXYGEN SATURATION: 98 %

## 2021-05-15 VITALS
TEMPERATURE: 97.9 F | SYSTOLIC BLOOD PRESSURE: 128 MMHG | HEART RATE: 48 BPM | WEIGHT: 152.25 LBS | DIASTOLIC BLOOD PRESSURE: 64 MMHG | HEIGHT: 66 IN | OXYGEN SATURATION: 100 % | BODY MASS INDEX: 24.47 KG/M2

## 2021-05-16 VITALS — WEIGHT: 146 LBS | HEART RATE: 57 BPM | BODY MASS INDEX: 23.46 KG/M2 | HEIGHT: 66 IN | OXYGEN SATURATION: 96 %

## 2021-05-16 VITALS — HEIGHT: 66 IN | OXYGEN SATURATION: 99 % | WEIGHT: 146 LBS | HEART RATE: 60 BPM | BODY MASS INDEX: 23.46 KG/M2

## 2021-05-16 VITALS — WEIGHT: 146 LBS | HEIGHT: 66 IN | BODY MASS INDEX: 23.46 KG/M2 | OXYGEN SATURATION: 99 % | HEART RATE: 63 BPM

## 2021-05-16 VITALS — BODY MASS INDEX: 22.82 KG/M2 | WEIGHT: 142 LBS | HEIGHT: 66 IN | OXYGEN SATURATION: 99 % | HEART RATE: 60 BPM

## 2021-09-14 RX ORDER — ACYCLOVIR 400 MG/1
400 TABLET ORAL
Qty: 25 TABLET | Refills: 2 | Status: SHIPPED | OUTPATIENT
Start: 2021-09-14 | End: 2021-09-19

## 2021-09-14 NOTE — TELEPHONE ENCOUNTER
Rx Refill Note  Requested Prescriptions     Pending Prescriptions Disp Refills   • acyclovir (ZOVIRAX) 400 MG tablet  0     Sig: Take 1 tablet by mouth Every 4 (Four) Hours While Awake. Take no more than 5 doses a day.      Last office visit with prescribing clinician: 7-27-20   PT LAST GIVEN RX 2-19-18 HAVING AN OUTBREAK NOW     Next office visit with prescribing clinician: 10/26/2021            Kristy Hanks MA  09/14/21, 08:48 EDT

## 2021-11-30 ENCOUNTER — TELEPHONE (OUTPATIENT)
Dept: ORTHOPEDIC SURGERY | Facility: CLINIC | Age: 51
End: 2021-11-30

## 2021-12-01 DIAGNOSIS — G89.29 CHRONIC PAIN OF LEFT KNEE: Primary | ICD-10-CM

## 2021-12-01 DIAGNOSIS — M25.562 CHRONIC PAIN OF LEFT KNEE: Primary | ICD-10-CM

## 2021-12-13 ENCOUNTER — TREATMENT (OUTPATIENT)
Dept: PHYSICAL THERAPY | Facility: CLINIC | Age: 51
End: 2021-12-13

## 2021-12-13 DIAGNOSIS — Z98.890 H/O ARTHROSCOPY OF LEFT KNEE: ICD-10-CM

## 2021-12-13 DIAGNOSIS — G89.29 CHRONIC PAIN OF LEFT KNEE: Primary | ICD-10-CM

## 2021-12-13 DIAGNOSIS — Z74.09 IMPAIRED FUNCTIONAL MOBILITY AND ACTIVITY TOLERANCE: ICD-10-CM

## 2021-12-13 DIAGNOSIS — M25.562 CHRONIC PAIN OF LEFT KNEE: Primary | ICD-10-CM

## 2021-12-13 PROCEDURE — 97162 PT EVAL MOD COMPLEX 30 MIN: CPT | Performed by: PHYSICAL THERAPIST

## 2021-12-13 PROCEDURE — 97110 THERAPEUTIC EXERCISES: CPT | Performed by: PHYSICAL THERAPIST

## 2021-12-13 NOTE — PROGRESS NOTES
Physical Therapy Initial Evaluation and Plan of Care    Patient: Priscilla Casanova   : 1970  Diagnosis/ICD-10 Code:  Chronic pain of left knee [M25.562, G89.29]  Referring practitioner: Ila Dickerson MD  Today's Date: 2021    Subjective Evaluation    History of Present Illness  Mechanism of injury: Chronic left knee pain - had MRO done showed MMT   Had medial menisectomy in 10/2020 - had some therapy after that - stopped PT early due to insurance issues  Saw Dr Dickerson 21 received cortisone shot at that time - about 1 month relief - was able to return to running   Had to stop running due to pain and swelling  Would run 15-20 miles on Saturday - averaged 30-35 miles per week  cycles      Patient Occupation:  - high school - [employer]  Current pain ratin  At best pain ratin  At worst pain rating: 10  Location: superior knee - quad tendon, medial joint linem medial tibial plateau, occasional stiffness/tightness in posterior knee, no locking no giving way  Quality: sharp, knife-like, tight, dull ache and discomfort  Relieving factors: ice and change in position  Aggravating factors: stairs and squatting (running, pivoting)  Progression: no change    Social Support  Lives in: multiple-level home    Diagnostic Tests  X-ray: abnormal (OA)    Treatments  Previous treatment: physical therapy  Current treatment: physical therapy  Patient Goals  Patient goal: be able to run again           Objective          Observations     Additional Knee Observation Details  Slightly swollen left knee, old scars on anterior knee    Palpation   Left   Tenderness of the distal semimembranosus and distal semitendinosus.     Tenderness   Left Knee   Tenderness in the medial joint line, medial patella, pes anserinus and quadriceps tendon.     Neurological Testing     Sensation     Knee   Left Knee   Intact: light touch    Active Range of Motion   Left Knee   Flexion: 138 degrees   Extension: 0  degrees   Extensor la degrees     Strength/Myotome Testing     Left Knee   Flexion: 5  Prone flexion: 4+  Extension: 4+  Quadriceps contraction: good          Assessment & Plan     Assessment  Impairments: activity intolerance, impaired physical strength, lacks appropriate home exercise program, pain with function and weight-bearing intolerance  Functional Limitations: walking and stooping  Assessment details: 51 y.o. female with chronic history of left knee pain and history of left knee medial menisectomy presents with: 1. Intermittent left knee medial joint pain, 2. Tenderness at the pes anserine area as well as distal medial hamstring tendons, 3. Slightly limited flexibility of the hamstrings, quads and ITB, 4. No signs of joint instability or internal derangement, 5. Increased pain with attempts of running, 6. Strong desire to continue running  Prognosis: good    Goals  Plan Goals: Short Term Goals: 2 weeks  Patient will be able to tolerate initial exercises  Patient will have pain <5/10  Patient will be able to climb up steps in a reciprocal fashion with minimal to no increase in symptoms  Patient will be able to walk for >20 minutes without increased symptoms    Long Term Goals: 6 weeks  Patient will be independent in performing home exercise program.  Patient will have functional pain free knee AROM  Patient will be able to tolerate light jogging without increased symptoms  Patient will be able to climb up/down inclines without increased symptoms    Plan  Therapy options: will be seen for skilled therapy services  Planned modality interventions: ultrasound  Planned therapy interventions: manual therapy, joint mobilization, home exercise program, stretching, strengthening, gait training, therapeutic activities and neuromuscular re-education  Other planned therapy interventions: kinesiotaping  Frequency: 2x week  Duration in visits: 12  Duration in weeks: 6  Treatment plan discussed with: patient  Plan  details: Access Code: [email or web address]  URL: [email or web address]  Date: 12/13/2021  Prepared by: Dee Dee Frank    Exercises  Supine Piriformis Stretch with Foot on Ground - 1 x daily - 7 x weekly - 1 sets - 3 reps - 20 hold  Supine Hamstring Stretch with Strap - 1 x daily - 7 x weekly - 1 sets - 3 reps - 20 hold  Supine ITB Stretch with Strap - 1 x daily - 7 x weekly - 1 sets - 3 reps - 20 hold  Prone Quadriceps Stretch with Strap - 1 x daily - 7 x weekly - 1 sets - 3 reps - 20 hold  Standing Hip Extension with Anchored Resistance - 1 x daily - 7 x weekly - 2 sets - 10 reps          Manual Therapy:    5     mins  45201;  Therapeutic Exercise:    25     mins  83552;     Neuromuscular Emeterio:    0    mins  49424;    Therapeutic Activity:     0     mins  96192;     Ultrasound                  __0_  mins  64873  Iontophoresis                 0    mins  83191    Electrical Stimulation     0    mins  26968 ([id number])  Traction                         _0  mins  92704     Evaluation Time:     30  mins  Timed Treatment:   30   mins   Total Treatment:     65   mins    PT: Dee Dee Frank PT     License Number: [place] [id number]  Electronically signed by Dee Dee Frank PT, 12/13/21, 3:41 PM EST    Certification Period: 12/14/2021 thru 3/13/2022  I certify that the therapy services are furnished while this patient is under my care.  The services outlined above are required by this patient, and will be reviewed every 90 days.         Physician Signature:__________________________________________________    PHYSICIAN: Ila Dickerson MD      DATE:     Please sign and return via fax to [email or web address] . Thank you, [facility].    PT SIGNATURE: Dee Dee Frank PT   [place] LICENSE:  [id number]

## 2021-12-21 ENCOUNTER — TREATMENT (OUTPATIENT)
Dept: PHYSICAL THERAPY | Facility: CLINIC | Age: 51
End: 2021-12-21

## 2021-12-21 DIAGNOSIS — Z98.890 H/O ARTHROSCOPY OF LEFT KNEE: ICD-10-CM

## 2021-12-21 DIAGNOSIS — M25.562 CHRONIC PAIN OF LEFT KNEE: Primary | ICD-10-CM

## 2021-12-21 DIAGNOSIS — Z74.09 IMPAIRED FUNCTIONAL MOBILITY AND ACTIVITY TOLERANCE: ICD-10-CM

## 2021-12-21 DIAGNOSIS — G89.29 CHRONIC PAIN OF LEFT KNEE: Primary | ICD-10-CM

## 2021-12-21 PROCEDURE — 97530 THERAPEUTIC ACTIVITIES: CPT | Performed by: PHYSICAL THERAPIST

## 2021-12-21 PROCEDURE — 97112 NEUROMUSCULAR REEDUCATION: CPT | Performed by: PHYSICAL THERAPIST

## 2021-12-21 PROCEDURE — 97110 THERAPEUTIC EXERCISES: CPT | Performed by: PHYSICAL THERAPIST

## 2021-12-21 NOTE — PROGRESS NOTES
Physical Therapy Daily Progress Note    Patient: Priscilla Casanova   : 1970  Referring practitioner: Ila Dickerson MD  Today's Date: 2021  Patient seen for 2 sessions    Visit Diagnoses:    ICD-10-CM ICD-9-CM   1. Chronic pain of left knee  M25.562 719.46    G89.29 338.29   2. H/O arthroscopy of left knee  Z98.890 V45.89   3. Impaired functional mobility and activity tolerance  Z74.09 V49.89       Subjective   Priscilla Casanova reports: that her leg is feeling a bit better than on her initial.  States that she has been running everyday.  Has been trying to increase her great two push off.  Denies any sense of giving way in the knee.  States that she felt good support with the tape on.      Objective     See Exercise, Manual, and Modality Logs for complete treatment.     Patient Education: pay attention to gait pattern.  Hit on lateral heel, push off on medial forefoot.    Assessment/Plan  Required cueing for proper HEP technique.  Rectus femoris is tight causing some pressure in quad tendon.  Relief of pressure with Kinesiotaping.    Progress strengthening /stabilization /functional activity           Timed:  Manual Therapy:    5     mins  53334;  Therapeutic Exercise:    20     mins  42904;     Neuromuscular Emeterio:    10    mins  64513;    Therapeutic Activity:     10     mins  01420;   Lengthy discussion of and practice with gait pattern on treadmill  Ultrasound:      0     mins  14144;    Iontophoresis              __0_   mins  Dry Needling               ____    mins        Untimed:  Electrical Stimulation:     0     mins  22928 ( );  Mechanical Traction:             mins  40695;     Timed Treatment:   45   mins   Total Treatment:     60   mins    Dee Dee Frank, PT  [place] License # [id number]  Physical Therapist    Electronically signed by Dee Dee Frank, PT, 21, 3:48 PM EST

## 2021-12-28 ENCOUNTER — TREATMENT (OUTPATIENT)
Dept: PHYSICAL THERAPY | Facility: CLINIC | Age: 51
End: 2021-12-28

## 2021-12-28 DIAGNOSIS — Z98.890 H/O ARTHROSCOPY OF LEFT KNEE: ICD-10-CM

## 2021-12-28 DIAGNOSIS — G89.29 CHRONIC PAIN OF LEFT KNEE: Primary | ICD-10-CM

## 2021-12-28 DIAGNOSIS — Z74.09 IMPAIRED FUNCTIONAL MOBILITY AND ACTIVITY TOLERANCE: ICD-10-CM

## 2021-12-28 DIAGNOSIS — M25.562 CHRONIC PAIN OF LEFT KNEE: Primary | ICD-10-CM

## 2021-12-28 PROCEDURE — 97112 NEUROMUSCULAR REEDUCATION: CPT | Performed by: PHYSICAL THERAPIST

## 2021-12-28 PROCEDURE — 97110 THERAPEUTIC EXERCISES: CPT | Performed by: PHYSICAL THERAPIST

## 2021-12-28 NOTE — PROGRESS NOTES
Physical Therapy Daily Progress Note    Patient: Priscilla Casanova   : 1970  Referring practitioner: Ila Dickerson MD  Today's Date: 2021  Patient seen for 3 sessions    Visit Diagnoses:    ICD-10-CM ICD-9-CM   1. Chronic pain of left knee  M25.562 719.46    G89.29 338.29   2. H/O arthroscopy of left knee  Z98.890 V45.89   3. Impaired functional mobility and activity tolerance  Z74.09 V49.89       Subjective   Priscilla Casanova reports: that the knee is feeling pretty good but still has some medial knee pain with running.  Pain seems to be after running - sharp stabbing type pain.  Occasional popping but no sense of instability.        Objective   Tenderness medial joint line    Restricted hamstring and ITB flexibility     See Exercise, Manual, and Modality Logs for complete treatment.     Patient Education: importance of home stretches    Assessment/Plan  Still having tightness in LE's - needs additional home stretches.   Balance still limited in SLS activity.    Progress strengthening /stabilization /functional activity           Timed:  Manual Therapy:    0     mins  24202;  Therapeutic Exercise:    40/50     mins  46490;     Neuromuscular Emeterio:    10    mins  30652;    Therapeutic Activity:     0     mins  64983;     Ultrasound:      0     mins  35178;    Iontophoresis              __0_   mins  Dry Needling               ____    mins        Untimed:  Electrical Stimulation:     0     mins  71140 ( );  Mechanical Traction:             mins  25133;     Timed Treatment:   50   mins   Total Treatment:     65   mins    Dee Dee Frank PT  [place] License # [id number]  Physical Therapist    Electronically signed by Dee Dee Frank PT, 21, 6:58 AM EST

## 2022-01-04 ENCOUNTER — TREATMENT (OUTPATIENT)
Dept: PHYSICAL THERAPY | Facility: CLINIC | Age: 52
End: 2022-01-04

## 2022-01-04 DIAGNOSIS — G89.29 CHRONIC PAIN OF LEFT KNEE: Primary | ICD-10-CM

## 2022-01-04 DIAGNOSIS — Z98.890 H/O ARTHROSCOPY OF LEFT KNEE: ICD-10-CM

## 2022-01-04 DIAGNOSIS — M25.562 CHRONIC PAIN OF LEFT KNEE: Primary | ICD-10-CM

## 2022-01-04 DIAGNOSIS — Z74.09 IMPAIRED FUNCTIONAL MOBILITY AND ACTIVITY TOLERANCE: ICD-10-CM

## 2022-01-04 PROCEDURE — 97112 NEUROMUSCULAR REEDUCATION: CPT | Performed by: PHYSICAL THERAPIST

## 2022-01-04 PROCEDURE — 97110 THERAPEUTIC EXERCISES: CPT | Performed by: PHYSICAL THERAPIST

## 2022-01-04 NOTE — PROGRESS NOTES
Physical Therapy Daily Progress Note    Patient: Priscilla Casanova   : 1970  Referring practitioner: Ila Dickerson MD  Today's Date: 2022  Patient seen for 4 sessions    Visit Diagnoses:    ICD-10-CM ICD-9-CM   1. Chronic pain of left knee  M25.562 719.46    G89.29 338.29   2. H/O arthroscopy of left knee  Z98.890 V45.89   3. Impaired functional mobility and activity tolerance  Z74.09 V49.89       Subjective   Priscilla Casanova reports: that her knee is doing fairly well.  She has not been running too much do to time.  Still has pain with reciprocal gait pattern on steps.  Has been avoiding normal stair climbing due to pressure.  No sense of giving out.,  Medial knee pain 0-4/10 in general but up to 8/10 with steps.  Thinks that her mobility has really improved.       Objective   Persistent tenderness in anterior medial left knee joint line    See Exercise, Manual, and Modality Logs for complete treatment.     Patient Education:new hip felxion/adduciton/rotation ex    Assessment/Plan  Patient feeling like her funcitonal mobility has increased as she has less pain with normal acttvities.  Still with tenderness in the medial joint line but not as pronounced.    Progress strengthening /stabilization /functional activity           Timed:  Manual Therapy:        mins  39777;  Therapeutic Exercise:    25/40     mins  51774;     Neuromuscular Emeterio:    10    mins  51964;    Therapeutic Activity:     0     mins  16471;     Ultrasound:      0/8     mins  17772;    Iontophoresis              __0_   mins  Dry Needling               ____    mins        Untimed:  Electrical Stimulation:     0     mins  01962 ( );  Mechanical Traction:             mins  39962;     Timed Treatment:   35   mins   Total Treatment:     70   mins    Dee Dee Frank, PT  [place] License # [id number]  Physical Therapist    Electronically signed by Dee Dee Frank PT, 22, 4:10 PM EST

## 2022-01-06 ENCOUNTER — HOSPITAL ENCOUNTER (EMERGENCY)
Facility: HOSPITAL | Age: 52
Discharge: HOME | End: 2022-01-06
Attending: EMERGENCY MEDICINE | Admitting: INTERNAL MEDICINE

## 2022-01-06 ENCOUNTER — ANESTHESIA EVENT (OUTPATIENT)
Dept: PERIOP | Facility: HOSPITAL | Age: 52
End: 2022-01-06

## 2022-01-06 ENCOUNTER — TELEMEDICINE (OUTPATIENT)
Dept: FAMILY MEDICINE CLINIC | Facility: TELEHEALTH | Age: 52
End: 2022-01-06

## 2022-01-06 ENCOUNTER — ANESTHESIA (OUTPATIENT)
Dept: PERIOP | Facility: HOSPITAL | Age: 52
End: 2022-01-06

## 2022-01-06 VITALS — BODY MASS INDEX: 28.82 KG/M2 | WEIGHT: 173 LBS | HEIGHT: 65 IN

## 2022-01-06 VITALS
TEMPERATURE: 98 F | DIASTOLIC BLOOD PRESSURE: 89 MMHG | RESPIRATION RATE: 20 BRPM | OXYGEN SATURATION: 99 % | SYSTOLIC BLOOD PRESSURE: 144 MMHG | HEART RATE: 75 BPM

## 2022-01-06 DIAGNOSIS — K21.9 GASTROESOPHAGEAL REFLUX DISEASE, UNSPECIFIED WHETHER ESOPHAGITIS PRESENT: ICD-10-CM

## 2022-01-06 DIAGNOSIS — T18.108A ESOPHAGEAL FOREIGN BODY, INITIAL ENCOUNTER: Primary | ICD-10-CM

## 2022-01-06 DIAGNOSIS — Z11.52 ENCOUNTER FOR SCREENING FOR COVID-19: Primary | ICD-10-CM

## 2022-01-06 DIAGNOSIS — R11.0 NAUSEA: ICD-10-CM

## 2022-01-06 PROBLEM — A64 STD (FEMALE): Status: ACTIVE | Noted: 2022-01-06

## 2022-01-06 PROBLEM — D64.9 ANEMIA: Status: ACTIVE | Noted: 2022-01-06

## 2022-01-06 PROBLEM — J30.2 SEASONAL ALLERGIC RHINITIS: Status: ACTIVE | Noted: 2022-01-06

## 2022-01-06 LAB
ALBUMIN SERPL-MCNC: 5.1 G/DL (ref 3.5–5.2)
ALBUMIN/GLOB SERPL: 2.2 G/DL
ALP SERPL-CCNC: 79 U/L (ref 39–117)
ALT SERPL W P-5'-P-CCNC: 20 U/L (ref 1–33)
ANION GAP SERPL CALCULATED.3IONS-SCNC: 16.1 MMOL/L (ref 5–15)
AST SERPL-CCNC: 24 U/L (ref 1–32)
BASOPHILS # BLD AUTO: 0.03 10*3/MM3 (ref 0–0.2)
BASOPHILS NFR BLD AUTO: 0.5 % (ref 0–1.5)
BILIRUB SERPL-MCNC: 0.8 MG/DL (ref 0–1.2)
BUN SERPL-MCNC: 18 MG/DL (ref 6–20)
BUN/CREAT SERPL: 21.4 (ref 7–25)
CALCIUM SPEC-SCNC: 9.7 MG/DL (ref 8.6–10.5)
CHLORIDE SERPL-SCNC: 109 MMOL/L (ref 98–107)
CO2 SERPL-SCNC: 21.9 MMOL/L (ref 22–29)
CREAT SERPL-MCNC: 0.84 MG/DL (ref 0.57–1)
DEPRECATED RDW RBC AUTO: 41.9 FL (ref 37–54)
EOSINOPHIL # BLD AUTO: 0.1 10*3/MM3 (ref 0–0.4)
EOSINOPHIL NFR BLD AUTO: 1.7 % (ref 0.3–6.2)
ERYTHROCYTE [DISTWIDTH] IN BLOOD BY AUTOMATED COUNT: 12.4 % (ref 12.3–15.4)
GFR SERPL CREATININE-BSD FRML MDRD: 71 ML/MIN/1.73
GLOBULIN UR ELPH-MCNC: 2.3 GM/DL
GLUCOSE SERPL-MCNC: 85 MG/DL (ref 65–99)
HCT VFR BLD AUTO: 43.1 % (ref 34–46.6)
HGB BLD-MCNC: 14.6 G/DL (ref 12–15.9)
IMM GRANULOCYTES # BLD AUTO: 0.01 10*3/MM3 (ref 0–0.05)
IMM GRANULOCYTES NFR BLD AUTO: 0.2 % (ref 0–0.5)
LYMPHOCYTES # BLD AUTO: 1.14 10*3/MM3 (ref 0.7–3.1)
LYMPHOCYTES NFR BLD AUTO: 19 % (ref 19.6–45.3)
MCH RBC QN AUTO: 31.3 PG (ref 26.6–33)
MCHC RBC AUTO-ENTMCNC: 33.9 G/DL (ref 31.5–35.7)
MCV RBC AUTO: 92.3 FL (ref 79–97)
MONOCYTES # BLD AUTO: 0.38 10*3/MM3 (ref 0.1–0.9)
MONOCYTES NFR BLD AUTO: 6.3 % (ref 5–12)
NEUTROPHILS NFR BLD AUTO: 4.34 10*3/MM3 (ref 1.7–7)
NEUTROPHILS NFR BLD AUTO: 72.3 % (ref 42.7–76)
NRBC BLD AUTO-RTO: 0 /100 WBC (ref 0–0.2)
PLATELET # BLD AUTO: 294 10*3/MM3 (ref 140–450)
PMV BLD AUTO: 9.1 FL (ref 6–12)
POTASSIUM SERPL-SCNC: 4 MMOL/L (ref 3.5–5.2)
PROT SERPL-MCNC: 7.4 G/DL (ref 6–8.5)
RBC # BLD AUTO: 4.67 10*6/MM3 (ref 3.77–5.28)
SARS-COV-2 RNA RESP QL NAA+PROBE: NOT DETECTED
SODIUM SERPL-SCNC: 147 MMOL/L (ref 136–145)
WBC NRBC COR # BLD: 6 10*3/MM3 (ref 3.4–10.8)

## 2022-01-06 PROCEDURE — 25010000002 PROPOFOL 10 MG/ML EMULSION: Performed by: ANESTHESIOLOGY

## 2022-01-06 PROCEDURE — 85025 COMPLETE CBC W/AUTO DIFF WBC: CPT | Performed by: NURSE PRACTITIONER

## 2022-01-06 PROCEDURE — 43247 EGD REMOVE FOREIGN BODY: CPT | Performed by: INTERNAL MEDICINE

## 2022-01-06 PROCEDURE — 99213 OFFICE O/P EST LOW 20 MIN: CPT | Performed by: NURSE PRACTITIONER

## 2022-01-06 PROCEDURE — U0003 INFECTIOUS AGENT DETECTION BY NUCLEIC ACID (DNA OR RNA); SEVERE ACUTE RESPIRATORY SYNDROME CORONAVIRUS 2 (SARS-COV-2) (CORONAVIRUS DISEASE [COVID-19]), AMPLIFIED PROBE TECHNIQUE, MAKING USE OF HIGH THROUGHPUT TECHNOLOGIES AS DESCRIBED BY CMS-2020-01-R: HCPCS | Performed by: NURSE PRACTITIONER

## 2022-01-06 PROCEDURE — 80053 COMPREHEN METABOLIC PANEL: CPT | Performed by: NURSE PRACTITIONER

## 2022-01-06 PROCEDURE — 25010000002 SUCCINYLCHOLINE PER 20 MG: Performed by: ANESTHESIOLOGY

## 2022-01-06 PROCEDURE — 99284 EMERGENCY DEPT VISIT MOD MDM: CPT

## 2022-01-06 RX ORDER — PROPOFOL 10 MG/ML
VIAL (ML) INTRAVENOUS AS NEEDED
Status: DISCONTINUED | OUTPATIENT
Start: 2022-01-06 | End: 2022-01-06 | Stop reason: SURG

## 2022-01-06 RX ORDER — ONDANSETRON 2 MG/ML
4 INJECTION INTRAMUSCULAR; INTRAVENOUS ONCE AS NEEDED
Status: DISCONTINUED | OUTPATIENT
Start: 2022-01-06 | End: 2022-01-07 | Stop reason: HOSPADM

## 2022-01-06 RX ORDER — PROMETHAZINE HYDROCHLORIDE 25 MG/1
25 SUPPOSITORY RECTAL ONCE AS NEEDED
Status: DISCONTINUED | OUTPATIENT
Start: 2022-01-06 | End: 2022-01-07 | Stop reason: HOSPADM

## 2022-01-06 RX ORDER — FENTANYL CITRATE 50 UG/ML
50 INJECTION, SOLUTION INTRAMUSCULAR; INTRAVENOUS
Status: DISCONTINUED | OUTPATIENT
Start: 2022-01-06 | End: 2022-01-07 | Stop reason: HOSPADM

## 2022-01-06 RX ORDER — PROMETHAZINE HYDROCHLORIDE 25 MG/1
25 TABLET ORAL ONCE AS NEEDED
Status: DISCONTINUED | OUTPATIENT
Start: 2022-01-06 | End: 2022-01-07 | Stop reason: HOSPADM

## 2022-01-06 RX ORDER — ROCURONIUM BROMIDE 10 MG/ML
INJECTION, SOLUTION INTRAVENOUS AS NEEDED
Status: DISCONTINUED | OUTPATIENT
Start: 2022-01-06 | End: 2022-01-06 | Stop reason: SURG

## 2022-01-06 RX ORDER — SUCCINYLCHOLINE CHLORIDE 20 MG/ML
INJECTION INTRAMUSCULAR; INTRAVENOUS AS NEEDED
Status: DISCONTINUED | OUTPATIENT
Start: 2022-01-06 | End: 2022-01-06 | Stop reason: SURG

## 2022-01-06 RX ORDER — PANTOPRAZOLE SODIUM 40 MG/1
40 TABLET, DELAYED RELEASE ORAL
Qty: 30 TABLET | Refills: 5 | Status: SHIPPED | OUTPATIENT
Start: 2022-01-06 | End: 2022-03-28

## 2022-01-06 RX ORDER — ONDANSETRON 8 MG/1
8 TABLET, ORALLY DISINTEGRATING ORAL EVERY 8 HOURS PRN
Qty: 9 TABLET | Refills: 0 | Status: SHIPPED | OUTPATIENT
Start: 2022-01-06 | End: 2022-03-28

## 2022-01-06 RX ORDER — HYDROMORPHONE HYDROCHLORIDE 1 MG/ML
0.25 INJECTION, SOLUTION INTRAMUSCULAR; INTRAVENOUS; SUBCUTANEOUS
Status: DISCONTINUED | OUTPATIENT
Start: 2022-01-06 | End: 2022-01-07 | Stop reason: HOSPADM

## 2022-01-06 RX ORDER — SODIUM CHLORIDE 0.9 % (FLUSH) 0.9 %
10 SYRINGE (ML) INJECTION AS NEEDED
Status: DISCONTINUED | OUTPATIENT
Start: 2022-01-06 | End: 2022-01-07 | Stop reason: HOSPADM

## 2022-01-06 RX ORDER — LIDOCAINE HYDROCHLORIDE 20 MG/ML
INJECTION, SOLUTION INFILTRATION; PERINEURAL AS NEEDED
Status: DISCONTINUED | OUTPATIENT
Start: 2022-01-06 | End: 2022-01-06 | Stop reason: SURG

## 2022-01-06 RX ADMIN — SUCCINYLCHOLINE CHLORIDE 120 MG: 20 INJECTION, SOLUTION INTRAMUSCULAR; INTRAVENOUS; PARENTERAL at 19:34

## 2022-01-06 RX ADMIN — PROPOFOL 150 MG: 10 INJECTION, EMULSION INTRAVENOUS at 19:34

## 2022-01-06 RX ADMIN — LIDOCAINE HYDROCHLORIDE 60 MG: 20 INJECTION, SOLUTION INFILTRATION; PERINEURAL at 19:34

## 2022-01-06 RX ADMIN — ROCURONIUM BROMIDE 5 MG: 50 INJECTION INTRAVENOUS at 19:34

## 2022-01-06 NOTE — ED NOTES
Pt arrived by PV reports, nausea with vomiting last night, CP starting yesterday. Pt states last night she ate steak, and feels like it is stuck in her throat. Pt unable to swallow     Patient was placed in face mask during first look triage.  Patient was wearing a face mask throughout encounter.  I wore personal protective equipment throughout the encounter.  Hand hygiene was performed before and after patient encounter.             Dina Douglas RN  01/06/22 1549

## 2022-01-06 NOTE — ED PROVIDER NOTES
EMERGENCY DEPARTMENT ENCOUNTER    Room Number:  02/02  Date seen:  1/6/2022  Time seen: 17:23 EST  PCP: Priyanka Rebolledo MD  Historian: patient,     HPI:  Chief complaint: food bolus  A complete HPI/ROS/PMH/PSH/SH/FH are unobtainable due to: n/a  Context:Priscilla Casanova is a 51 y.o. female who presents to the ED with c/o eating ribs for dinner last night and feeling as though a piece of meat is stuck in her esophagus.  Since last night she has been unable to keep down fluids and anytime she drinks something, she then has some chest pain and she vomits up whatever she drank.  Her problem is not made better/worse by anything.  She has h/o EGD in past and has had her esophagus stretched.  No current GI provider.  She has no shortness of breath. She does mention she has been doing a fasting diet and when she does go to eat, she is so hungry that she feels she is eating faster.     Patient was placed in face mask in first look. Patient was wearing facemask when I entered the room and throughout our encounter. I wore full protective equipment throughout this patient encounter including a N95 face mask, eye shield and gloves. Hand hygiene/washing of hands was performed before donning protective equipment and after removal when leaving the room.    MEDICAL RECORD REVIEW    ALLERGIES  Patient has no known allergies.    PAST MEDICAL HISTORY  Active Ambulatory Problems     Diagnosis Date Noted   • Tear of medial meniscus of left knee, current 09/22/2020   • Anemia 01/06/2022   • GERD (gastroesophageal reflux disease) 01/06/2022   • STD (female) 01/06/2022   • Seasonal allergic rhinitis 01/06/2022     Resolved Ambulatory Problems     Diagnosis Date Noted   • No Resolved Ambulatory Problems     Past Medical History:   Diagnosis Date   • Arthritis    • Limited joint range of motion    • Seasonal allergies    • Torn medial meniscus 07/2020       PAST SURGICAL HISTORY  Past Surgical History:   Procedure Laterality Date   •  CERVICAL BIOPSY  W/ LOOP ELECTRODE EXCISION  2005   • ESOPHAGEAL DILATATION     • KNEE ARTHROSCOPY Left 10/5/2020    Procedure: KNEE ARTHROSCOPY, PARTIAL MEDIAL MENISECTOMMY AND DEBRIDEMENT OF PATELLA;  Surgeon: Ila Dickerson MD;  [place] [facility] [place] [facility]  Service: Orthopedics;  Laterality: Left;   • WISDOM TOOTH EXTRACTION         FAMILY HISTORY  Family History   Problem Relation Age of Onset   • Malig Hyperthermia Neg Hx        SOCIAL HISTORY  Social History     Socioeconomic History   • Marital status:    Tobacco Use   • Smoking status: Never Smoker   • Smokeless tobacco: Never Used   Vaping Use   • Vaping Use: Never used   Substance and Sexual Activity   • Alcohol use: No   • Drug use: Defer   • Sexual activity: Defer       REVIEW OF SYSTEMS  Review of Systems    All systems reviewed and negative except for those discussed in HPI.     PHYSICAL EXAM    ED Triage Vitals   Temp Heart Rate Resp BP SpO2   01/06/22 1702 01/06/22 1702 01/06/22 1702 01/06/22 1704 01/06/22 1702   98.8 °F (37.1 °C) 99 18 138/99 99 %      Temp src Heart Rate Source Patient Position BP Location FiO2 (%)   01/06/22 1702 01/06/22 1702 01/06/22 1704 01/06/22 1704 --   Tympanic Monitor Standing Left arm      Physical Exam    I have reviewed the triage vital signs and nursing notes.      GENERAL: not distressed  HENT: nares patent, mucous membranes moist  EYES: no scleral icterus  NECK: no ROM limitations  CV: regular rhythm, regular rate, no murmur, no rubs no gallops  RESPIRATORY: normal effort, clear to auscultate bilaterally  ABDOMEN: soft  : deferred  MUSCULOSKELETAL: no deformity  NEURO: alert, moves all extremities, follows commands  SKIN: warm, dry    LAB RESULTS  No results found for this or any previous visit (from the past 24 hour(s)).  Pending at time of admission    RADIOLOGY RESULTS  No Radiology Exams Resulted Within Past 24 Hours       PROGRESS, DATA ANALYSIS, CONSULTS AND MEDICAL DECISION MAKING  All labs have been  independently reviewed by me.  All radiology studies have been reviewed by me and discussed with radiologist dictating the report.  EKG's independently viewed and interpreted by me unless stated otherwise. Discussion below represents my analysis of pertinent findings related to patient's condition, differential diagnosis, treatment plan and final disposition.     ED Course as of 01/06/22 1802   Thu Jan 06, 2022   1734 Pt arrived to room with emesis bag.  I provided her a few sips of water and she is unable to keep the water down.  I have placed call to GI [EW]   1755 Discussed pt food bolus with Dr. Jarrett and he will call in Endo team.  [EW]      ED Course User Index  [EW] Joi Lin, MANOJ     DDX: esophageal food bolus, n/v    MDM: The patient is unable to keep down oral fluids and regurgitates them up.  This has been going on for almost 24 hours now.  I discussed care with  and he will take the patient to ENDO.       Reviewed pt's history and workup with Dr. Quan.  After a bedside evaluation, Dr. Quan agrees with the plan of care.    Based on the patient's lab findings and presenting symptoms, the doctor and I feel it is appropriate to admit the patient for further management, evaluation, and treatment.  I have discussed this with the admitting team.  I have also discussed this with the patient/family.  They are in agreement with admission.          Disposition vitals:  /99 (BP Location: Left arm, Patient Position: Standing)   Pulse 99   Temp 98.8 °F (37.1 °C) (Tympanic)   Resp 16   SpO2 100%       DIAGNOSIS  Final diagnoses:   Esophageal foreign body, initial encounter       TO OR for Endo      Joi Lin APRN  01/06/22 1804

## 2022-01-06 NOTE — PATIENT INSTRUCTIONS
COVID-19: What Your Test Results Mean  If you test positive for COVID-19  Take steps to help prevent the spread of COVID-19  Stay home.   Do not leave your home, except to get medical care. Do not visit public areas.  Get rest and stay hydrated.  Take over-the-counter medicines, such as acetaminophen, to help you feel better.  Stay in touch with your doctor.  Separate yourself from other people.   As much as possible, stay in a specific room and away from other people and pets in your home.  If you test negative for COVID-19  · You probably were not infected at the time your sample was collected.  · However, that does not mean you will not get sick.  · It is possible that you were very early in your infection when your sample was collected and that you could test positive later.  A negative test result does not mean you won't get sick later.  cdc.gov/coronavirus  05/30/2020  This information is not intended to replace advice given to you by your health care provider. Make sure you discuss any questions you have with your health care provider.  Document Revised: 07/13/2021 Document Reviewed: 07/13/2021  [employer] Patient Education © 2021 [employer]      Food Choices for Gastroesophageal Reflux Disease, Adult  When you have gastroesophageal reflux disease (GERD), the foods you eat and your eating habits are very important. Choosing the right foods can help ease your discomfort. Think about working with a food expert (dietitian) to help you make good choices.  What are tips for following this plan?  Reading food labels  · Look for foods that are low in saturated fat. Foods that may help with your symptoms include:  ? Foods that have less than 5% of daily value (DV) of fat.  ? Foods that have 0 grams of trans fat.  Cooking  · Do not clark your food.  · Cook your food by baking, steaming, grilling, or broiling. These are all methods that do not need a lot of fat for cooking.  · To add flavor, try to use herbs that are  low in spice and acidity.  Meal planning    · Choose healthy foods that are low in fat, such as:  ? Fruits and vegetables.  ? Whole grains.  ? Low-fat dairy products.  ? Lean meats, fish, and poultry.  · Eat small meals often instead of eating 3 large meals each day. Eat your meals slowly in a place where you are relaxed. Avoid bending over or lying down until 2-3 hours after eating.  · Limit high-fat foods such as fatty meats or fried foods.  · Limit your intake of fatty foods, such as oils, butter, and shortening.  · Avoid the following as told by your doctor:  ? Foods that cause symptoms. These may be different for different people. Keep a food diary to keep track of foods that cause symptoms.  ? Alcohol.  ? Drinking a lot of liquid with meals.  ? Eating meals during the 2-3 hours before bed.    Lifestyle  · Stay at a healthy weight. Ask your doctor what weight is healthy for you. If you need to lose weight, work with your doctor to do so safely.  · Exercise for at least 30 minutes on 5 or more days each week, or as told by your doctor.  · Wear loose-fitting clothes.  · Do not smoke or use any products that contain nicotine or tobacco. If you need help quitting, ask your doctor.  · Sleep with the head of your bed higher than your feet. Use a wedge under the mattress or blocks under the bed frame to raise the head of the bed.  · Chew sugar-free gum after meals.  What foods should eat?    Eat a healthy, well-balanced diet of fruits, vegetables, whole grains, low-fat dairy products, lean meats, fish, and poultry. Each person is different.  Foods that may cause symptoms in one person may not cause any symptoms in another person. Work with your doctor to find foods that are safe for you.  The items listed above may not be a complete list of what you can eat and drink. Contact a food expert for more options.  What foods should I avoid?  Limiting some of these foods may help in managing the symptoms of GERD. Everyone is  different. Talk with a food expert or your doctor to help you find the exact foods to avoid, if any.  Fruits  Any fruits prepared with added fat. Any fruits that cause symptoms. For some people, this may include citrus fruits, such as oranges, grapefruit, pineapple, and alonso.  Vegetables  Deep-fried vegetables. French fries. Any vegetables prepared with added fat. Any vegetables that cause symptoms. For some people, this may include tomatoes and tomato products, chili peppers, onions and garlic, and horseradish.  Grains  Pastries or quick breads with added fat.  Meats and other proteins  High-fat meats, such as fatty beef or pork, hot dogs, ribs, ham, sausage, salami, and vela. Fried meat or protein, including fried fish and fried chicken. Nuts and nut butters, in large amounts.  Dairy  Whole milk and chocolate milk. Sour cream. Cream. Ice cream. Cream cheese. Milkshakes.  Fats and oils  Butter. Margarine. Shortening. Ghee.  Beverages  Coffee and tea, with or without caffeine. Carbonated beverages. Sodas. Energy drinks. Fruit juice made with acidic fruits, such as orange or grapefruit. Tomato juice. Alcoholic drinks.  Sweets and desserts  Chocolate and cocoa. Donuts.  Seasonings and condiments  Pepper. Peppermint and spearmint. Added salt. Any condiments, herbs, or seasonings that cause symptoms. For some people, this may include awad, hot sauce, or vinegar-based salad dressings.  The items listed above may not be a complete list of what you should not eat and drink. Contact a food expert for more options.  Questions to ask your doctor  Diet and lifestyle changes are often the first steps that are taken to manage symptoms of GERD. If diet and lifestyle changes do not help, talk with your doctor about taking medicines.  Where to find more information  · International Foundation for Gastrointestinal Disorders: aboutgerd.org  Summary  · When you have GERD, food and lifestyle choices are very important in easing  your symptoms.  · Eat small meals often instead of 3 large meals a day. Eat your meals slowly and in a place where you are relaxed.  · Avoid bending over or lying down until 2-3 hours after eating.  · Limit high-fat foods such as fatty meats or fried foods.  This information is not intended to replace advice given to you by your health care provider. Make sure you discuss any questions you have with your health care provider.  Document Revised: 06/28/2021 Document Reviewed: 06/28/2021  [employer] Patient Education © 2021 [employer]    Nausea, Adult  Nausea is the feeling that you have an upset stomach or that you are about to vomit. Nausea on its own is not usually a serious concern, but it may be an early sign of a more serious medical problem. As nausea gets worse, it can lead to vomiting. If vomiting develops, or if you are not able to drink enough fluids, you are at risk of becoming dehydrated. Dehydration can make you tired and thirsty, cause you to have a dry mouth, and decrease how often you urinate. Older adults and people with other diseases or a weak disease-fighting system (immune system) are at higher risk for dehydration. The main goals of treating your nausea are:  · To relieve your nausea.  · To limit repeated nausea episodes.  · To prevent vomiting and dehydration.  Follow these instructions at home:  Watch your symptoms for any changes. Tell your health care provider about them. Follow these instructions as told by your health care provider.  Eating and drinking         · Take an oral rehydration solution (ORS). This is a drink that is sold at pharmacies and retail stores.  · Drink clear fluids slowly and in small amounts as you are able. Clear fluids include water, ice chips, low-calorie sports drinks, and fruit juice that has water added (diluted fruit juice).  · Eat bland, easy-to-digest foods in small amounts as you are able. These foods include bananas, applesauce, rice, lean meats, toast, and  crackers.  · Avoid drinking fluids that contain a lot of sugar or caffeine, such as energy drinks, sports drinks, and soda.  · Avoid alcohol.  · Avoid spicy or fatty foods.  General instructions  · Take over-the-counter and prescription medicines only as told by your health care provider.  · Rest at home while you recover.  · Drink enough fluid to keep your urine pale yellow.  · Breathe slowly and deeply when you feel nauseous.  · Avoid smelling things that have strong odors.  · Wash your hands often using soap and water. If soap and water are not available, use hand .  · Make sure that all people in your household wash their hands well and often.  · Keep all follow-up visits as told by your health care provider. This is important.  Contact a health care provider if:  · Your nausea gets worse.  · Your nausea does not go away after two days.  · You vomit.  · You cannot drink fluids without vomiting.  · You have any of the following:  ? New symptoms.  ? A fever.  ? A headache.  ? Muscle cramps.  ? A rash.  ? Pain while urinating.  · You feel light-headed or dizzy.  Get help right away if:  · You have pain in your chest, neck, arm, or jaw.  · You feel extremely weak or you faint.  · You have vomit that is bright red or looks like coffee grounds.  · You have bloody or black stools or stools that look like tar.  · You have a severe headache, a stiff neck, or both.  · You have severe pain, cramping, or bloating in your abdomen.  · You have difficulty breathing or are breathing very quickly.  · Your heart is beating very quickly.  · Your skin feels cold and clammy.  · You feel confused.  · You have signs of dehydration, such as:  ? Dark urine, very little urine, or no urine.  ? Cracked lips.  ? Dry mouth.  ? Sunken eyes.  ? Sleepiness.  ? Weakness.  These symptoms may represent a serious problem that is an emergency. Do not wait to see if the symptoms will go away. Get medical help right away. Call your local  emergency services (911 in the [place] Do not drive yourself to the hospital.  Summary  · Nausea is the feeling that you have an upset stomach or that you are about to vomit. Nausea on its own is not usually a serious concern, but it may be an early sign of a more serious medical problem.  · If vomiting develops, or if you are not able to drink enough fluids, you are at risk of becoming dehydrated.  · Follow recommendations for eating and drinking and take over-the-counter and prescription medicines only as told by your health care provider.  · Contact a health care provider right away if your symptoms worsen or you have new symptoms.  · Keep all follow-up visits as told by your health care provider. This is important.  This information is not intended to replace advice given to you by your health care provider. Make sure you discuss any questions you have with your health care provider.  Document Revised: 11/17/2020 Document Reviewed: 05/28/2019  [employer] Patient Education © 2021 [employer]

## 2022-01-06 NOTE — ED PROVIDER NOTES
I have supervised the care provided by the midlevel provider.    We have discussed this patient's history, physical exam, and treatment plan.   I have reviewed the note and have personally examined the patient and agree with the plan of care.  See attached attending note.  My personal findings are below:    Patient complains of difficulty swallowing. She feels like something is stuck in her esophagus. Symptoms began when she was eating ribs for dinner last night. Since then whenever she tries to swallow liquids, she regurgitates. She has had her esophagus stretched x2 in the past.    On exam:  General: Awake, alert, oriented x3, nontoxic-appearing  HEENT: NCAT, nares patent, she is frequently spitting her saliva into an emesis bag  CV: Regular rhythm, regular rate  Respiratory: Normal effort, normal breath sounds bilaterally  Abd: Soft, nontender  MS: Extremities are nontender.  No obvious deformity  Neuro: Normal speech and mentation.   Follows commands  Psych: Normal mood and affect  Skin: No rash      Patient regurgitated after taking a few sips of water. Suspect she has a food bolus. Call was placed to GI.     Toby Quan MD  01/06/22 2607

## 2022-01-06 NOTE — PROGRESS NOTES
You have chosen to receive care through a telehealth visit.  Do you consent to use a video/audio connection for your medical care today? Yes     CHIEF COMPLAINT  Cc: reflux    HPI  Priscilla Casanova is a 51 y.o. female  presents with complaint of reflux. She reports that she is having regurg every few minuses. It started last night when she was eating prime rib and has been ongoing every since that time. Additional symptoms include decreased appetite, fatigue, ear pain from strain, sore throat from spitting up, some constipation, nausea  Cramping and tightness in the esophageal region. She reports that she has been trying to hydrated with Gatorade but it comes back up too. Additionally she reports that she has been fasting and then tried to start eating again, and that she has had her esophagus stretched in the past. No known COVID-19 exposure. She is a . She is vaccinated via 3 does of the Moderna vaccine.    Review of Systems   Constitutional: Positive for appetite change (decreased) and fatigue. Negative for fever.   HENT: Positive for ear pain, sinus pressure, sinus pain and sore throat (from spitting up). Negative for congestion, rhinorrhea, sneezing and tinnitus.    Respiratory: Negative for cough, chest tightness, shortness of breath and wheezing.    Cardiovascular: Negative for chest pain.   Gastrointestinal: Positive for constipation and nausea. Negative for abdominal distention, diarrhea and vomiting. Abdominal pain: cramping.        Tight in esophogeal region, regurg   Musculoskeletal: Negative for myalgias.   Neurological: Positive for headaches.       Past Medical History:   Diagnosis Date   • Arthritis     LT KNEE   • Limited joint range of motion     LEFT   • Seasonal allergies    • Torn medial meniscus 07/2020    LT KNEE       Family History   Problem Relation Age of Onset   • Malig Hyperthermia Neg Hx        Social History     Socioeconomic History   • Marital status:    Tobacco  "Use   • Smoking status: Never Smoker   • Smokeless tobacco: Never Used   Vaping Use   • Vaping Use: Never used   Substance and Sexual Activity   • Alcohol use: No   • Drug use: Defer   • Sexual activity: Defer         Ht 165.1 cm (65\")   Wt 78.5 kg (173 lb)   BMI 28.79 kg/m²     PHYSICAL EXAM  Physical Exam   Constitutional: She is oriented to person, place, and time. She appears well-developed and well-nourished.   HENT:   Head: Normocephalic and atraumatic.   Right Ear: There is tenderness (patient directed exam).   Left Ear: There is tenderness (patient directed exam).   Nose: Congestion present. Right sinus exhibits maxillary sinus tenderness (patient directed exam) and frontal sinus tenderness (patient directed exam). Left sinus exhibits maxillary sinus tenderness (patient directed exam) and frontal sinus tenderness (patient directed exam).   patient reported not visualized, spitting up intermittently at visit   Eyes: Lids are normal. Right eye exhibits no discharge and no exudate. Left eye exhibits no discharge and no exudate. Right conjunctiva is not injected. Left conjunctiva is not injected.   Pulmonary/Chest: No accessory muscle usage. No tachypnea and no bradypnea.  No respiratory distress.No use of oxygen by nasal cannulaNo use of oxygen by mask noted.  Abdominal: There is no abdominal tenderness.   Neurological: She is alert and oriented to person, place, and time. No cranial nerve deficit.   Skin: Her skin appears normal.  Psychiatric: She has a normal mood and affect. Her speech is normal and behavior is normal. Judgment and thought content normal.       Results for orders placed or performed in visit on 10/02/20   COVID-19,[facility], NP/OP SWAB IN TRANSPORT MEDIA OR SALINE 24-36 HR TAT - Swab, Nasopharynx    Specimen: Nasopharynx; Swab   Result Value Ref Range    SARS-CoV-2 PCR Not Detected Not Detected       Diagnoses and all orders for this visit:    1. Encounter for screening for COVID-19 " (Primary)  -     Ambulatory Referral to Gastroenterology  -     COVID-19,[facility] ROUTINE, NP/OP SWAB IN TRANSPORT MEDIA OR ESWAB 72 HR TAT - Swab, Nasopharynx; Future    2. Nausea  -     Ambulatory Referral to Gastroenterology  -     COVID-19,[facility] ROUTINE, NP/OP SWAB IN TRANSPORT MEDIA OR ESWAB 72 HR TAT - Swab, Nasopharynx; Future    3. Gastroesophageal reflux disease, unspecified whether esophagitis present  -     Ambulatory Referral to Gastroenterology  -     COVID-19,[facility] ROUTINE, NP/OP SWAB IN TRANSPORT MEDIA OR ESWAB 72 HR TAT - Swab, Nasopharynx; Future    Other orders  -     ondansetron ODT (Zofran ODT) 8 MG disintegrating tablet; Place 1 tablet on the tongue Every 8 (Eight) Hours As Needed for Nausea or Vomiting.  Dispense: 9 tablet; Refill: 0    Isolate for 10 days from onset of symptoms and 24 hours fever/symptoms free without fever reducers  As you are fully vaccinated if your COVID test comes back negative and when you are symptom and fever free for 24 hours you may discontinue isolation  COVID-19 test and results pending. We will call results to you and they can also be found in MY Chart  Referral for gastroenterologist ordered for you   Hydrate well. Water, Pedialyte and Gatorade are best.  Zofran as ordered and directed for pain or fever    FOLLOW-UP  If symptoms worsen or persist follow up with PCP/[facility] or Urgent Care  Follow up with gastroenterologist  Follow up ER if increase in abdominal pain    Patient verbalizes understanding of medication dosage, comfort measures, instructions for treatment and follow-up.    Caryn Bassett, APRN  01/06/2022  08:40 EST    This visit was performed via Telehealth.  This patient has been instructed to follow-up with their primary care provider if their symptoms worsen or the treatment provided does not resolve their illness.

## 2022-01-07 NOTE — ANESTHESIA POSTPROCEDURE EVALUATION
Patient: Priscilla Casanova    Procedure Summary     Date: 01/06/22 Room / Location: [facility] OR 03 / [facility] MAIN OR    Anesthesia Start: 1930 Anesthesia Stop: 2025    Procedure: ESOPHAGOGASTRODUODENOSCOPY FOR FOOD BOLUS (N/A Esophagus) Diagnosis:     Surgeons: Bayron Jarrett MD Provider: Jaylen Melvin MD    Anesthesia Type: general ASA Status: 2          Anesthesia Type: general    Vitals  Vitals Value Taken Time   /81 01/06/22 2046   Temp 36.7 °C (98 °F) 01/06/22 2045   Pulse 79 01/06/22 2056   Resp 16 01/06/22 2045   SpO2 97 % 01/06/22 2056   Vitals shown include unvalidated device data.        Post Anesthesia Care and Evaluation    Patient location during evaluation: bedside  Patient participation: complete - patient participated  Level of consciousness: awake and alert  Pain management: adequate  Airway patency: patent  Anesthetic complications: No anesthetic complications    Cardiovascular status: acceptable  Respiratory status: acceptable  Hydration status: acceptable    Comments: /81   Pulse 72   Temp 36.7 °C (98 °F) (Oral)   Resp 16   SpO2 99%

## 2022-01-07 NOTE — CONSULTS
[facility]  Initial Inpatient Consult Note    Referring Provider: MANOJ Lin    Reason for Consultation: Dysphagia    Subjective     History of present illness:    51 y.o. female presents to ER c/o trouble swallowing.  Symptoms began last night after eating ribs.  Unable to swallow any liquids since.  Some sensation of fullness in her chest.  Managing secretions.  Reports prior esophageal dilation in her 30s.  Does not report GERD.      Past Medical History:  Past Medical History:   Diagnosis Date   • Arthritis     LT KNEE   • Limited joint range of motion     LEFT   • Seasonal allergies    • Torn medial meniscus 07/2020    LT KNEE     Past Surgical History:  Past Surgical History:   Procedure Laterality Date   • CERVICAL BIOPSY  W/ LOOP ELECTRODE EXCISION  2005   • ESOPHAGEAL DILATATION     • KNEE ARTHROSCOPY Left 10/5/2020    Procedure: KNEE ARTHROSCOPY, PARTIAL MEDIAL MENISECTOMMY AND DEBRIDEMENT OF PATELLA;  Surgeon: Ila Dickerson MD;  [place] [facility] [place] [facility] [place]  Service: [place]opedics;  Laterality: Left;   • WISDOM TOOTH EXTRACTION        Social History:   Social History     Tobacco Use   • Smoking status: Never Smoker   • Smokeless tobacco: Never Used   Substance Use Topics   • Alcohol use: No      Family History:  Family History   Problem Relation Age of Onset   • Malig Hyperthermia Neg Hx        Home Meds:  Medications Prior to Admission   Medication Sig Dispense Refill Last Dose   • ondansetron ODT (Zofran ODT) 8 MG disintegrating tablet Place 1 tablet on the tongue Every 8 (Eight) Hours As Needed for Nausea or Vomiting. 9 tablet 0 1/6/2022 at 1000   • Chlorcyclizine-Pseudoephed (STAHIST AD) 25-60 MG tablet Take 1 tablet by mouth Every 8 (Eight) Hours As Needed (congestion). 30 tablet 0    • meloxicam (MOBIC) 15 MG tablet 1 PO Daily with food. 30 tablet 0      Current Meds:      Allergies:  No Known Allergies  Review of Systems  All systems were reviewed and negative except for:   Gastrointestinal: positive for  difficulty / pain with swallowing     Objective     Vital Signs  Temp:  [98.8 °F (37.1 °C)] 98.8 °F (37.1 °C)  Heart Rate:  [99] 99  Resp:  [16-18] 16  BP: (138)/(99) 138/99  Physical Exam:  General Appearance:     Alert, cooperative, in no acute distress   Head:    Normocephalic, without obvious abnormality, atraumatic   Eyes:            Lids and lashes normal, conjunctivae and sclerae normal, no icterus   Throat:   No oral lesions, no thrush, oral mucosa moist   Neck:   No adenopathy, supple, trachea midline, no thyromegaly, no carotid bruit, no JVD, No crepitus   Lungs:     Clear to auscultation,respirations regular, even and            unlabored    Heart:    Regular rhythm and normal rate, normal S1 and S2, no        murmur, no gallop, no rub, no click   Chest Wall:    No abnormalities observed   Abdomen:     Normal bowel sounds, no masses, no organomegaly, soft     nontender, nondistended, no guarding, no rebound                 tenderness   Rectal:     Deferred   Extremities:   no edema, no cyanosis, no redness   Skin:   No bleeding, bruising or rash   Lymph nodes:   No palpable adenopathy   Psychiatric:  Judgement and insight: normal   Orientation to person place and time: normal   Mood and affect: normal   Results Review:   I reviewed the patient's new clinical results.    Results from last 7 days   Lab Units 01/06/22  1757   WBC 10*3/mm3 6.00   HEMOGLOBIN g/dL 14.6   HEMATOCRIT % 43.1   PLATELETS 10*3/mm3 294     Results from last 7 days   Lab Units 01/06/22  1757   SODIUM mmol/L 147*   POTASSIUM mmol/L 4.0   CHLORIDE mmol/L 109*   CO2 mmol/L 21.9*   BUN mg/dL 18   CREATININE mg/dL 0.84   CALCIUM mg/dL 9.7   BILIRUBIN mg/dL 0.8   ALK PHOS U/L 79   ALT (SGPT) U/L 20   AST (SGOT) U/L 24   GLUCOSE mg/dL 85         No results found for: LIPASE    Radiology:  No orders to display       Assessment/Plan   Assessment:   1.  Esophageal obstruction secondary to food bolus  2.   Esophageal stricture    Plan:   Plan for EGD with GETA and removal of food bolus  Further recommendations to follow based on EGD results    I discussed the patients findings and my recommendations with patient.         Bayron Jarrett M.D.  [facility]  [address] [place] [address]  Office: [phone]

## 2022-01-07 NOTE — ANESTHESIA PREPROCEDURE EVALUATION
Anesthesia Evaluation     Patient summary reviewed   NPO Solid Status: N/A             Airway   No difficulty expected  Dental      Pulmonary    Cardiovascular     Rhythm: regular        Neuro/Psych  GI/Hepatic/Renal/Endo    (+)  GERD,      Musculoskeletal     Abdominal    Substance History      OB/GYN          Other   arthritis,                      Anesthesia Plan    ASA 2     general       Anesthetic plan, all risks, benefits, and alternatives have been provided, discussed and informed consent has been obtained with: patient.  Use of blood products discussed with patient .

## 2022-01-07 NOTE — ANESTHESIA PROCEDURE NOTES
Airway  Urgency: elective    Date/Time: 1/6/2022 7:36 PM  Airway not difficult    General Information and Staff    Patient location during procedure: OR  Anesthesiologist: Jaylen Melvin MD    Indications and Patient Condition  Indications for airway management: airway protection    Preoxygenated: yes  MILS maintained throughout  Mask difficulty assessment: 1 - vent by mask    Final Airway Details  Final airway type: endotracheal airway      Successful airway: ETT  Cuffed: yes   Successful intubation technique: direct laryngoscopy  Endotracheal tube insertion site: oral  Blade: Gordon  Blade size: 2  ETT size (mm): 7.0  Cormack-Lehane Classification: grade I - full view of glottis  Placement verified by: chest auscultation   Number of attempts at approach: 1  Assessment: lips, teeth, and gum same as pre-op

## 2022-01-10 ENCOUNTER — TELEPHONE (OUTPATIENT)
Dept: GASTROENTEROLOGY | Facility: CLINIC | Age: 52
End: 2022-01-10

## 2022-01-10 DIAGNOSIS — K56.699 FOOD BOLUS OBSTRUCTION OF INTESTINE: Primary | ICD-10-CM

## 2022-01-10 NOTE — TELEPHONE ENCOUNTER
----- Message from Bayron Jarrett MD sent at 1/6/2022  8:22 PM EST -----  Please schedule pt for barium esophagram - indication is s/p food bolus.

## 2022-02-11 ENCOUNTER — TELEPHONE (OUTPATIENT)
Dept: GASTROENTEROLOGY | Facility: CLINIC | Age: 52
End: 2022-02-11

## 2022-02-11 ENCOUNTER — HOSPITAL ENCOUNTER (OUTPATIENT)
Dept: GENERAL RADIOLOGY | Facility: HOSPITAL | Age: 52
Discharge: HOME | End: 2022-02-11
Admitting: INTERNAL MEDICINE

## 2022-02-11 DIAGNOSIS — K56.699 FOOD BOLUS OBSTRUCTION OF INTESTINE: ICD-10-CM

## 2022-02-11 PROCEDURE — 74220 X-RAY XM ESOPHAGUS 1CNTRST: CPT

## 2022-02-11 PROCEDURE — 0 DIATRIZOATE MEGLUMINE & SODIUM PER 1 ML: Performed by: INTERNAL MEDICINE

## 2022-02-11 RX ADMIN — BARIUM SULFATE 700 MG: 700 TABLET ORAL at 10:15

## 2022-02-11 RX ADMIN — DIATRIZOATE MEGLUMINE AND DIATRIZOATE SODIUM 20 ML: 660; 100 LIQUID ORAL; RECTAL at 10:05

## 2022-02-11 RX ADMIN — BARIUM SULFATE 183 ML: 960 POWDER, FOR SUSPENSION ORAL at 10:15

## 2022-02-11 NOTE — TELEPHONE ENCOUNTER
----- Message from Giselle Lainez RN sent at 2/11/2022 10:25 AM EST -----  Regarding: FW: esophagram  Contact: [phone]  Luiza pt  ----- Message -----  From: Charan Gordon RegSched Rep  Sent: 2/10/2022   3:20 PM EST  To: [facility] Clinical 1 Pool  Subject: esophagram                                       Pt has some questions regarding the esophagram that is scheduled tomorrow.

## 2022-02-18 NOTE — PROGRESS NOTES
There was some delay in passage of the barium tablet although no clear stricture was noted.  Please have the patient follow-up in the office in 6 to 8 weeks to discuss.  We will continue with daily PPI.

## 2022-02-22 ENCOUNTER — TELEPHONE (OUTPATIENT)
Dept: GASTROENTEROLOGY | Facility: CLINIC | Age: 52
End: 2022-02-22

## 2022-02-22 NOTE — TELEPHONE ENCOUNTER
----- Message from Bayron Jarrett MD sent at 2/18/2022  4:51 PM EST -----  There was some delay in passage of the barium tablet although no clear stricture was noted.  Please have the patient follow-up in the office in 6 to 8 weeks to discuss.  We will continue with daily PPI.

## 2022-02-22 NOTE — TELEPHONE ENCOUNTER
Called pt and advised of Dr Jarrett's note.  Pt verbalized understanding.     Follow up appt 3/28 @3:30pm.

## 2022-03-28 ENCOUNTER — TELEPHONE (OUTPATIENT)
Dept: GASTROENTEROLOGY | Facility: CLINIC | Age: 52
End: 2022-03-28

## 2022-03-28 ENCOUNTER — OFFICE VISIT (OUTPATIENT)
Dept: GASTROENTEROLOGY | Facility: CLINIC | Age: 52
End: 2022-03-28

## 2022-03-28 VITALS
SYSTOLIC BLOOD PRESSURE: 122 MMHG | BODY MASS INDEX: 26.28 KG/M2 | DIASTOLIC BLOOD PRESSURE: 82 MMHG | HEART RATE: 60 BPM | WEIGHT: 163.5 LBS | HEIGHT: 66 IN | OXYGEN SATURATION: 95 % | TEMPERATURE: 96.6 F

## 2022-03-28 DIAGNOSIS — R13.19 ESOPHAGEAL DYSPHAGIA: Primary | ICD-10-CM

## 2022-03-28 PROCEDURE — 99214 OFFICE O/P EST MOD 30 MIN: CPT | Performed by: INTERNAL MEDICINE

## 2022-03-28 RX ORDER — SODIUM CHLORIDE, SODIUM LACTATE, POTASSIUM CHLORIDE, CALCIUM CHLORIDE 600; 310; 30; 20 MG/100ML; MG/100ML; MG/100ML; MG/100ML
30 INJECTION, SOLUTION INTRAVENOUS CONTINUOUS
Status: CANCELLED | OUTPATIENT
Start: 2022-11-08

## 2022-03-28 RX ORDER — PANTOPRAZOLE SODIUM 40 MG/1
40 TABLET, DELAYED RELEASE ORAL DAILY
COMMUNITY
End: 2022-08-29

## 2022-03-28 NOTE — TELEPHONE ENCOUNTER
AUDREY Messer for colonoscopy/EGD on  06/30/2022 arrive at 730am   . Gave Prep instructions in office. ----miralax      Advised PT  that  will call with final arrival time  24 hrs before procedure. If they do not get a phone call, arrival time will stay the same as given on instructions

## 2022-03-28 NOTE — PROGRESS NOTES
Chief Complaint   Patient presents with   • Difficulty Swallowing     Subjective     HPI  Priscilla Casanova is a 51 y.o. female who presents today for office follow up.  She was seen back in January of this year when she came in after hours with an esophageal obstruction.  EGD was performed with a large amount of food at the GE junction removed and then eventually gently pushed into the stomach.  There was evidence of some esophagitis at the GE junction related to the impaction but no clear stricture was identified.  She had a follow-up barium esophagram performed which is noted below.  There was temporary delay in passage of the barium tablet in the midesophagus but no obvious stricture or stenosis.  There was some moderate dysmotility noted.  Overall she been doing well since EGD she does report that she occasionally feels food being slow to pass through her chest typically occurring with drier foods she is not really eating much in the way of meat currently.  She was given a prescription for pantoprazole at the time of her EGD but has not been taking this very regularly as she has not had true heartburn symptoms.      Esophagram:  No findings of esophageal leak are present. The barium tablet became  temporarily lodged within the mid esophagus prior to passing into the  stomach. No findings of significant stenosis are visualized on contrast  images. There was delayed clearance of contrast from the mid to distal  esophagus with episodes of retrograde flow of contrast as well  indicating at least mild to moderate dysmotility. Esophagus has a  thickened appearance in keeping with the recently seen esophagitis on  endoscopy performed 01/06/2022. Suggestion of a small hiatal hernia.    Objective   Vitals:    03/28/22 1546   BP: 122/82   Pulse: 60   Temp: 96.6 °F (35.9 °C)   SpO2: 95%       Physical Exam  Vitals reviewed.   Constitutional:       Appearance: She is well-developed.   HENT:      Head: Normocephalic and  atraumatic.   Neurological:      Mental Status: She is alert and oriented to person, place, and time.   Psychiatric:         Behavior: Behavior normal.         Thought Content: Thought content normal.         Judgment: Judgment normal.       The following data was reviewed by: Bayron Jarrett MD on 03/28/2022:  Common labs    Common Labsle 1/6/22 1/6/22    1757 1757   Glucose  85   BUN  18   Creatinine  0.84   eGFR Non African Am  71   Sodium  147 (A)   Potassium  4.0   Chloride  109 (A)   Calcium  9.7   Albumin  5.10   Total Bilirubin  0.8   Alkaline Phosphatase  79   AST (SGOT)  24   ALT (SGPT)  20   WBC 6.00    Hemoglobin 14.6    Hematocrit 43.1    Platelets 294    (A) Abnormal value            Data reviewed: Radiologic studies Esophagram 2/2022 and GI studies EGD 1/2022     Assessment/Plan   Assessment:     1. Esophageal dysphagia    2.      Encounter for screening for colon cancer    Plan:   Patient's recent esophageal obstruction related to food bolus may have been related more to motility disorder than true stricture or stenosis.  I would like to repeat her EGD to reevaluate the esophagus potentially perform dilation or biopsies from the proximal and distal esophagus to rule out EOE.  I would like her to take pantoprazole every morning 30 minutes before breakfast.  She can use as needed sublingual peppermint Altoid's 30 minutes prior to meals.  She is due for average risk screening colonoscopy we will perform that at the same time as her EGD             Bayron Jarrett M.D.  [facility]  [address] [place] [address]  Office: [phone]

## 2022-05-18 ENCOUNTER — TELEPHONE (OUTPATIENT)
Dept: GASTROENTEROLOGY | Facility: CLINIC | Age: 52
End: 2022-05-18

## 2022-05-18 NOTE — TELEPHONE ENCOUNTER
RETURNED PT CALL TO CANCEL / RESCHEDULE 6-30 NO ANSWER LM ON VM PT TO CALL BACK TO RESCHEDULE PUT IN DEPOT WITH ALEK

## 2022-05-18 NOTE — TELEPHONE ENCOUNTER
Patient called this morning about rescheduling her procedure with Dr. Jarrett. Please call patient to reschedule.

## 2022-08-29 RX ORDER — PANTOPRAZOLE SODIUM 40 MG/1
TABLET, DELAYED RELEASE ORAL
Qty: 30 TABLET | Refills: 5 | Status: SHIPPED | OUTPATIENT
Start: 2022-08-29

## 2022-11-08 ENCOUNTER — ANESTHESIA EVENT (OUTPATIENT)
Dept: GASTROENTEROLOGY | Facility: HOSPITAL | Age: 52
End: 2022-11-08

## 2022-11-08 ENCOUNTER — OFFICE VISIT (OUTPATIENT)
Dept: OBSTETRICS AND GYNECOLOGY | Facility: CLINIC | Age: 52
End: 2022-11-08

## 2022-11-08 ENCOUNTER — HOSPITAL ENCOUNTER (OUTPATIENT)
Facility: HOSPITAL | Age: 52
Setting detail: HOSPITAL OUTPATIENT SURGERY
Discharge: HOME | End: 2022-11-08
Attending: INTERNAL MEDICINE | Admitting: INTERNAL MEDICINE

## 2022-11-08 ENCOUNTER — ANESTHESIA (OUTPATIENT)
Dept: GASTROENTEROLOGY | Facility: HOSPITAL | Age: 52
End: 2022-11-08

## 2022-11-08 VITALS
BODY MASS INDEX: 26.63 KG/M2 | SYSTOLIC BLOOD PRESSURE: 117 MMHG | HEART RATE: 68 BPM | WEIGHT: 160 LBS | DIASTOLIC BLOOD PRESSURE: 78 MMHG

## 2022-11-08 VITALS
TEMPERATURE: 97.8 F | HEART RATE: 50 BPM | DIASTOLIC BLOOD PRESSURE: 58 MMHG | OXYGEN SATURATION: 98 % | WEIGHT: 158.8 LBS | BODY MASS INDEX: 26.46 KG/M2 | RESPIRATION RATE: 16 BRPM | HEIGHT: 65 IN | SYSTOLIC BLOOD PRESSURE: 102 MMHG

## 2022-11-08 DIAGNOSIS — Z30.431 IUD CHECK UP: ICD-10-CM

## 2022-11-08 DIAGNOSIS — Z01.419 WELL WOMAN EXAM WITH ROUTINE GYNECOLOGICAL EXAM: Primary | ICD-10-CM

## 2022-11-08 DIAGNOSIS — R13.19 ESOPHAGEAL DYSPHAGIA: ICD-10-CM

## 2022-11-08 LAB
B-HCG UR QL: POSITIVE
EXPIRATION DATE: ABNORMAL
HCG INTACT+B SERPL-ACNC: 9.66 MIU/ML
HCG SERPL QL: POSITIVE
INTERNAL NEGATIVE CONTROL: ABNORMAL
INTERNAL POSITIVE CONTROL: ABNORMAL
Lab: ABNORMAL

## 2022-11-08 PROCEDURE — 99396 PREV VISIT EST AGE 40-64: CPT | Performed by: OBSTETRICS & GYNECOLOGY

## 2022-11-08 PROCEDURE — 87624 HPV HI-RISK TYP POOLED RSLT: CPT | Performed by: OBSTETRICS & GYNECOLOGY

## 2022-11-08 PROCEDURE — 81025 URINE PREGNANCY TEST: CPT | Performed by: INTERNAL MEDICINE

## 2022-11-08 PROCEDURE — 0 LIDOCAINE 1 % SOLUTION: Performed by: ANESTHESIOLOGY

## 2022-11-08 PROCEDURE — 84702 CHORIONIC GONADOTROPIN TEST: CPT | Performed by: INTERNAL MEDICINE

## 2022-11-08 PROCEDURE — 43239 EGD BIOPSY SINGLE/MULTIPLE: CPT | Performed by: INTERNAL MEDICINE

## 2022-11-08 PROCEDURE — 45380 COLONOSCOPY AND BIOPSY: CPT | Performed by: INTERNAL MEDICINE

## 2022-11-08 PROCEDURE — G0123 SCREEN CERV/VAG THIN LAYER: HCPCS | Performed by: OBSTETRICS & GYNECOLOGY

## 2022-11-08 PROCEDURE — 88305 TISSUE EXAM BY PATHOLOGIST: CPT | Performed by: INTERNAL MEDICINE

## 2022-11-08 PROCEDURE — 84703 CHORIONIC GONADOTROPIN ASSAY: CPT | Performed by: INTERNAL MEDICINE

## 2022-11-08 PROCEDURE — 25010000002 PROPOFOL 200 MG/20ML EMULSION: Performed by: ANESTHESIOLOGY

## 2022-11-08 RX ORDER — PROPOFOL 10 MG/ML
INJECTION, EMULSION INTRAVENOUS CONTINUOUS PRN
Status: DISCONTINUED | OUTPATIENT
Start: 2022-11-08 | End: 2022-11-08 | Stop reason: SURG

## 2022-11-08 RX ORDER — LIDOCAINE HYDROCHLORIDE 10 MG/ML
INJECTION, SOLUTION INFILTRATION; PERINEURAL AS NEEDED
Status: DISCONTINUED | OUTPATIENT
Start: 2022-11-08 | End: 2022-11-08 | Stop reason: SURG

## 2022-11-08 RX ORDER — SODIUM CHLORIDE, SODIUM LACTATE, POTASSIUM CHLORIDE, CALCIUM CHLORIDE 600; 310; 30; 20 MG/100ML; MG/100ML; MG/100ML; MG/100ML
30 INJECTION, SOLUTION INTRAVENOUS CONTINUOUS
Status: DISCONTINUED | OUTPATIENT
Start: 2022-11-08 | End: 2022-11-08 | Stop reason: HOSPADM

## 2022-11-08 RX ADMIN — SODIUM CHLORIDE, POTASSIUM CHLORIDE, SODIUM LACTATE AND CALCIUM CHLORIDE 30 ML/HR: 600; 310; 30; 20 INJECTION, SOLUTION INTRAVENOUS at 07:37

## 2022-11-08 RX ADMIN — LIDOCAINE HYDROCHLORIDE 40 MG: 10 INJECTION, SOLUTION INFILTRATION; PERINEURAL at 08:03

## 2022-11-08 RX ADMIN — PROPOFOL 200 MCG/KG/MIN: 10 INJECTION, EMULSION INTRAVENOUS at 08:07

## 2022-11-08 NOTE — ASSESSMENT & PLAN NOTE
Has had a Mirena IUD for the past 2 years.  Explained I do not think this is necessary for contraception at this point the patient declined removal and will follow up for annual exam next year

## 2022-11-08 NOTE — H&P
"[facility]  Pre Procedure History & Physical    Chief Complaint:   Dysphagia  Screening for colon cancer    Subjective     HPI:   52 y.o. female here for EGD/colonscopy.  Dysphagia and screening for colon cancer.     Past Medical History:   Past Medical History:   Diagnosis Date   • Anesthesia complication     \"SLOW TO WAKE\"   • Arthritis     LT KNEE   • Limited joint range of motion     LEFT   • Seasonal allergies    • Torn medial meniscus 07/2020    LT KNEE       Past Surgical History:  Past Surgical History:   Procedure Laterality Date   • CERVICAL BIOPSY  W/ LOOP ELECTRODE EXCISION  2005   • ENDOSCOPY N/A 1/6/2022    Procedure: ESOPHAGOGASTRODUODENOSCOPY FOR FOOD BOLUS;  Surgeon: Bayron Jarrett MD;  Location: [facility] OR;  Service: Gastroenterology;  Laterality: N/A;   • ESOPHAGEAL DILATATION     • KNEE ARTHROSCOPY Left 10/5/2020    Procedure: KNEE ARTHROSCOPY, PARTIAL MEDIAL MENISECTOMMY AND DEBRIDEMENT OF PATELLA;  Surgeon: Ila Dickerson MD;  Location: [facility];  Service: Orthopedics;  Laterality: Left;   • WISDOM TOOTH EXTRACTION         Family History:  Family History   Problem Relation Age of Onset   • Malig Hyperthermia Neg Hx        Social History:   reports that she has never smoked. She has never used smokeless tobacco. She reports current alcohol use. Drug use questions deferred to the physician.    Medications:   Medications Prior to Admission   Medication Sig Dispense Refill Last Dose   • pantoprazole (PROTONIX) 40 MG EC tablet TAKE 1 TABLET BY MOUTH TWICE DAILY BEFORE MEALS 30 tablet 5 Past Week       Allergies:  Patient has no known allergies.    ROS:    Pertinent items are noted in HPI     Objective     Blood pressure 135/89, pulse 65, resp. rate 19, height 165.1 cm (65\"), weight 72 kg (158 lb 12.8 oz), SpO2 99 %, not currently breastfeeding.    Physical Exam   Constitutional: Pt is oriented to person, place, and time and well-developed, well-nourished, " and in no distress.   Mouth/Throat: Oropharynx is clear and moist.   Neck: Normal range of motion.   Cardiovascular: Normal rate, regular rhythm and normal heart sounds.    Pulmonary/Chest: Effort normal and breath sounds normal.   Abdominal: Soft. Nontender  Skin: Skin is warm and dry.   Psychiatric: Mood, memory, affect and judgment normal.     Assessment & Plan     Diagnosis:  Dysphagia  Screening for colon cancer      Anticipated Surgical Procedure:  EGD  Colonoscopy    The risks, benefits, and alternatives of this procedure have been discussed with the patient or the responsible party- the patient understands and agrees to proceed.      Post-op addendum:  Informed by nursing staff AFTER we had already begun procedure of positive urine HCG test.  Apparently test was negative at 3 minutes but noted to be weakly positive beyond 3 minute cutoff.  Confirmed with repeat.  A qualitative and quantitative HCG has been ordered.    Discussed with patient and , they apparently had similar episode about 1 year ago pt was not found to be pregnant.  Actually participated in clinical trial regarding elevated HCG in post menopausal women.  She already has previously scheduled OB/GYN appointment this afternoon and will discuss further with her GYN

## 2022-11-08 NOTE — PROGRESS NOTES
Well Woman Visit    CC: Scheduled annual well gyn visit  Chief Complaint   Patient presents with   • Annual Exam       Myriad intake in the past?: No        Contraception:  Mirena IUD, Post menopausal    HPI:   52 y.o.       PCP: does manage PMHx and preventative labs  History: PMHx, Meds, Allergies, PSHx, Social Hx, and POBHx all reviewed and updated.    Pt has no complaints today.    PHYSICAL EXAM:  /78   Pulse 68   Wt 72.6 kg (160 lb)   BMI 26.63 kg/m²  Not found.  General- NAD, alert and oriented, appropriate  Psych- Normal mood, good memory  Neck- No masses, no thyroid enlargement  CV- Regular rhythm, no murnurs  Resp- CTA to bases, no wheezes  Abdomen- Soft, non distended, non tender, no masses    Breast left-  Bilaterally symmetrical, no masses, non tender, no nipple discharge  Breast right- Bilaterally symmetrical, no masses, non tender, no nipple discharge    External genitalia- Normal female, no lesions  Urethra/meatus- Normal, no masses, non tender  Bladder- Normal, no masses, non tender  Vagina- Normal, moderate atrophy, no lesions, no discharge.  Prolapse: none  Cvx- Normal, no lesions, no discharge, No cervical motion tenderness, IUD strings visable 2cm  Uterus- Normal size, shape & consistency.  Non tender, mobile, & no prolapse  Adnexa- No mass, non tender  Anus/Rectum/Perineum- Not performed    Lymphatic- No palpable neck, axillary, or groin nodes  Ext- No edema, no cyanosis    Skin- No lesions, no rashes, no acanthosis nigricans      ASSESSMENT and PLAN:    Diagnoses and all orders for this visit:    1. Well woman exam with routine gynecological exam (Primary)  -     Mammo Screening Digital Tomosynthesis Bilateral With CAD; Future    2. IUD check up  Assessment & Plan:  Has had a Mirena IUD for the past 2 years.  Explained I do not think this is necessary for contraception at this point the patient declined removal and will follow up for annual exam next  year          Preventative:  • BREAST HEALTH- Monthly self breast exam importance and how to reviewed. MMG and/or MRI (prn) reviewed per society guidelines and her individual history. Screen: Pt will call to schedule  • CERVICAL CANCER Screening- Reviewed current ASCCP guidelines for screening w and wo cotest HPV, age specific.  Screen: Updated today  • COLON CANCER Screening- Reviewed current medical society guidelines and options.  Screen:  Already up to date  • Follow up PCP/Specialist PMHx and Labs      She understands the importance of having any ordered tests to be performed in a timely fashion.  The risks of not performing them include, but are not limited to, advanced cancer stages, bone loss from osteoporosis and/or subsequent increase in morbidity and/or mortality.  She is encouraged to review her results online and/or contact or office if she has questions.     Follow Up:  Return in about 1 year (around 11/8/2023) for Annual physical.            Marianela Flores MD  11/08/2022    [facility]  [facility] [place] [address]  Dept: [phone]  Dept Fax: [phone]  Loc: [phone]  Loc Fax: [phone]

## 2022-11-08 NOTE — NURSING NOTE
POC 3 minute urine pregnancy test performed and was negative at 3 minutes. Patient states she has not had a period in over 12 months and has an IUD. She also states she is postmenopausal.

## 2022-11-08 NOTE — ANESTHESIA PREPROCEDURE EVALUATION
Anesthesia Evaluation     NPO Solid Status: > 8 hours  NPO Liquid Status: > 8 hours           Airway   Mallampati: I  TM distance: >3 FB  No difficulty expected  Dental      Pulmonary    Cardiovascular         Neuro/Psych  GI/Hepatic/Renal/Endo    (+)  GERD,      Musculoskeletal     Abdominal    Substance History      OB/GYN          Other   arthritis,                      Anesthesia Plan    ASA 2     MAC     intravenous induction     Anesthetic plan, risks, benefits, and alternatives have been provided, discussed and informed consent has been obtained with: patient.        CODE STATUS:

## 2022-11-08 NOTE — ANESTHESIA POSTPROCEDURE EVALUATION
Patient: Priscilla Casanova    Procedure Summary     Date: 11/08/22 Room / Location: [facility] ENDOSCOPY 10 /  [facility]    Anesthesia Start: 0803 Anesthesia Stop: 0837    Procedures:       ESOPHAGOGASTRODUODENOSCOPY WITH BIOPSY (Esophagus)      COLONOSCOPY to cecum Diagnosis:       Esophageal dysphagia      (Esophageal dysphagia [R13.19])    Surgeons: Bayron Jarrett MD Provider: Edward Alba MD    Anesthesia Type: MAC ASA Status: 2          Anesthesia Type: MAC    Vitals  Vitals Value Taken Time   /58 11/08/22 0855   Temp 36.6 °C (97.8 °F) 11/08/22 0835   Pulse 50 11/08/22 0855   Resp 16 11/08/22 0855   SpO2 98 % 11/08/22 0855           Post Anesthesia Care and Evaluation    Patient location during evaluation: PACU  Patient participation: complete - patient participated  Level of consciousness: awake  Pain scale: See nurse's notes for pain score.  Pain management: adequate    Airway patency: patent  Anesthetic complications: No anesthetic complications  PONV Status: none  Cardiovascular status: acceptable  Respiratory status: acceptable  Hydration status: acceptable    Comments: Patient seen and examined postoperatively; vital signs stable; SpO2 greater than or equal to 90%; cardiopulmonary status stable; nausea/vomiting adequately controlled; pain adequately controlled; no apparent anesthesia complications; patient discharged from anesthesia care when discharge criteria were met

## 2022-11-09 LAB
LAB AP CASE REPORT: NORMAL
PATH REPORT.FINAL DX SPEC: NORMAL
PATH REPORT.GROSS SPEC: NORMAL

## 2022-11-15 LAB
CYTOLOGIST CVX/VAG CYTO: NORMAL
CYTOLOGY CVX/VAG DOC CYTO: NORMAL
CYTOLOGY CVX/VAG DOC THIN PREP: NORMAL
DX ICD CODE: NORMAL
HIV 1 & 2 AB SER-IMP: NORMAL
HPV I/H RISK 4 DNA CVX QL PROBE+SIG AMP: NEGATIVE
OTHER STN SPEC: NORMAL
STAT OF ADQ CVX/VAG CYTO-IMP: NORMAL

## 2022-11-18 ENCOUNTER — APPOINTMENT (OUTPATIENT)
Dept: OTHER | Facility: HOSPITAL | Age: 52
End: 2022-11-18

## 2022-11-18 ENCOUNTER — HOSPITAL ENCOUNTER (OUTPATIENT)
Dept: MAMMOGRAPHY | Facility: HOSPITAL | Age: 52
Discharge: HOME | End: 2022-11-18
Admitting: OBSTETRICS & GYNECOLOGY

## 2022-11-18 DIAGNOSIS — Z09 FOLLOW-UP EXAM: ICD-10-CM

## 2022-11-18 DIAGNOSIS — Z01.419 WELL WOMAN EXAM WITH ROUTINE GYNECOLOGICAL EXAM: ICD-10-CM

## 2022-11-18 PROCEDURE — 77067 SCR MAMMO BI INCL CAD: CPT

## 2022-11-18 PROCEDURE — 77063 BREAST TOMOSYNTHESIS BI: CPT

## 2022-11-18 NOTE — PROGRESS NOTES
EGD bx normal  Colon polyps TAs, recall 5 years  Areas of inflammation showed no significant pathologic changes  Recall colonoscopy in 5 years  Office f/u in 3 mos

## 2022-11-30 ENCOUNTER — TELEPHONE (OUTPATIENT)
Dept: GASTROENTEROLOGY | Facility: CLINIC | Age: 52
End: 2022-11-30

## 2022-11-30 NOTE — TELEPHONE ENCOUNTER
Hub staff attempted to follow warm transfer process and was unsuccessful     Caller: Priscilla Casanova    Relationship to patient: Self    Best call back number: [phone]    Patient is needing: PATIENT RETURNING MISSED CALL FROM PRACTICE. WOULD LIKE FOR SOMEONE IN OFFICE TO GIVE HER ANOTHER CALL. PATIENT CAN BE REACHED ANYTIME AFTER 2:00PM.

## 2022-12-02 ENCOUNTER — TELEPHONE (OUTPATIENT)
Dept: OBSTETRICS AND GYNECOLOGY | Facility: CLINIC | Age: 52
End: 2022-12-02

## 2022-12-02 DIAGNOSIS — A60.04 HERPES, VULVOVAGINITIS: Primary | ICD-10-CM

## 2022-12-02 RX ORDER — ACYCLOVIR 400 MG/1
400 TABLET ORAL
Qty: 25 TABLET | Refills: 0 | Status: SHIPPED | OUTPATIENT
Start: 2022-12-02 | End: 2023-03-07 | Stop reason: SDUPTHER

## 2022-12-02 NOTE — TELEPHONE ENCOUNTER
The patient was last seen on 11/08/2022.  She is having an outbreak and requests a refill of Acyclovir.  I have sent on refill for her to take during outbreak per protocol.

## 2022-12-02 NOTE — TELEPHONE ENCOUNTER
Caller: Priscilla Casanova    Relationship: Self    Best call back number:[phone]    Requested Prescriptions:   ACYCOLVIR 400MG    Requested Prescriptions      No prescriptions requested or ordered in this encounter        Pharmacy where request should be sent:    [facility]   [address] [place] [address]        Does the patient have less than a 3 day supply:  [x] Yes  [] No    Would you like a call back once the refill request has been completed: [x] Yes [] No    If the office needs to give you a call back, can they leave a voicemail: [x] Yes [] No    Ann Martinez Rep   12/02/22 09:18 EST

## 2023-02-06 ENCOUNTER — OFFICE VISIT (OUTPATIENT)
Dept: GASTROENTEROLOGY | Facility: CLINIC | Age: 53
End: 2023-02-06
Payer: COMMERCIAL

## 2023-02-06 VITALS
SYSTOLIC BLOOD PRESSURE: 137 MMHG | WEIGHT: 167 LBS | TEMPERATURE: 96.4 F | HEART RATE: 57 BPM | OXYGEN SATURATION: 99 % | DIASTOLIC BLOOD PRESSURE: 84 MMHG | BODY MASS INDEX: 27.82 KG/M2 | HEIGHT: 65 IN

## 2023-02-06 DIAGNOSIS — K21.9 GASTROESOPHAGEAL REFLUX DISEASE WITHOUT ESOPHAGITIS: Primary | ICD-10-CM

## 2023-02-06 DIAGNOSIS — D12.6 ADENOMATOUS POLYP OF COLON, UNSPECIFIED PART OF COLON: ICD-10-CM

## 2023-02-06 PROCEDURE — 99213 OFFICE O/P EST LOW 20 MIN: CPT | Performed by: INTERNAL MEDICINE

## 2023-02-06 RX ORDER — HYDROCORTISONE ACETATE 25 MG/1
25 SUPPOSITORY RECTAL NIGHTLY PRN
Qty: 30 SUPPOSITORY | Refills: 1 | Status: SHIPPED | OUTPATIENT
Start: 2023-02-06

## 2023-02-06 RX ORDER — DICLOFENAC SODIUM 75 MG/1
1 TABLET, DELAYED RELEASE ORAL EVERY 12 HOURS SCHEDULED
COMMUNITY
Start: 2023-01-06

## 2023-02-06 NOTE — PROGRESS NOTES
Chief Complaint   Patient presents with   • Heartburn     Controlled      Subjective     HPI  Priscilla Casanova is a 52 y.o. female who presents today for office follow up.     Hemorrhoid exacerbated by evening      Objective   Vitals:    02/06/23 1533   BP: 137/84   Pulse: 57   Temp: 96.4 °F (35.8 °C)   SpO2: 99%       Physical Exam           Assessment & Plan   Assessment:   No diagnosis found.      Plan:           Bayron Jarrett M.D.  [facility]  [address] [place] [address]  Office: [phone]

## 2023-02-06 NOTE — PROGRESS NOTES
Chief Complaint   Patient presents with   • Heartburn     Controlled      Subjective     HPI  Priscilla Casanova is a 52 y.o. female who presents today for office follow up.     S/P recent EGD/colonsopy done for hx of dysphagia and for colon cancer screening.  EGD with a nonobstructing distal esophageal ring which was biopsied for disruption.  Biopsies from the distal and proximal esophagus did not show evidence of eosinophilic esophagitis.  She is maintained on pantoprazole daily with no active heartburn symptoms or dysphagia.    Colonoscopy significant for a small tubular adenoma in the ascending colon.  There was some patchy inflammation in the descending colon and biopsies performed in multiple areas showed only some mild hyperplastic change without significant pathologic inflammation.  No lower GI sx. She is on NSAIDs.    Final Diagnosis   1. GE Junction Biopsy: Predominantly squamous mucosa and scant glandular mucosa with   A. No significant inflammation nor eosinophilia identified.  B. No intestinal metaplasia identified by routine staining.  C. No dysplasia nor malignancy identified.     2. Distal Esophagus Biopsy: Squamous mucosa with   A. No significant inflammation nor eosinophilia identified.  B. No intestinal metaplasia identified by routine staining.  C. No dysplasia nor malignancy identified.     3. Proximal Esophagus Biopsy: Squamous mucosa with   A. No significant inflammation nor eosinophilia identified.  B. No intestinal metaplasia identified by routine staining.  C. No dysplasia nor malignancy identified.     4. Cecum Biopsy: Colonic mucosa with                A. Focal developing tubular adenoma.               B. Associated benign colon with normal crypt architecture.               C. No high-grade dysplasia nor malignancy identified.     5. Ascending Colon Biopsy:               A. Developing tubular adenoma.               B. No high-grade glandular dysplasia nor malignancy identified.     6.  Descending Colon Biopsy. Colonic mucosa with               A. Mild hyperplastic change, normal crypt architecture and lymphoid aggregate.               B. No active inflammation nor granulomatous inflammation identified.               C. No glandular dysplasia nor malignancy identified.         Objective   Vitals:    02/06/23 1533   BP: 137/84   Pulse: 57   Temp: 96.4 °F (35.8 °C)   SpO2: 99%       Physical Exam  Vitals reviewed.   Constitutional:       Appearance: She is well-developed.   HENT:      Head: Normocephalic and atraumatic.   Neurological:      Mental Status: She is alert and oriented to person, place, and time.   Psychiatric:         Behavior: Behavior normal.         Thought Content: Thought content normal.         Judgment: Judgment normal.                Assessment & Plan   Assessment:     1. Gastroesophageal reflux disease without esophagitis    2. Adenomatous polyp of colon, unspecified part of colon      Plan:   Continue current dose protonix  Repeat EGD PRN  Recall colonoscopy 5 years    Office f/u 1 year          Bayron Jarrett M.D.  [facility]  [address] [place] [address]  Office: [phone]

## 2023-03-07 DIAGNOSIS — A60.04 HERPES, VULVOVAGINITIS: ICD-10-CM

## 2023-03-07 NOTE — TELEPHONE ENCOUNTER
Patient called this pm.  She is requesting a refill on Acyclovir.  She states having an outbreak.  Last seen 11/8/22.  Last filled 12/2/22 Acyclovir 400 #25 with no refills.  I have attached an order.  Next appointment 11/9/23

## 2023-03-08 RX ORDER — ACYCLOVIR 400 MG/1
400 TABLET ORAL
Qty: 25 TABLET | Refills: 10 | Status: SHIPPED | OUTPATIENT
Start: 2023-03-08

## 2023-07-21 ENCOUNTER — LAB (OUTPATIENT)
Dept: LAB | Facility: HOSPITAL | Age: 53
End: 2023-07-21
Payer: COMMERCIAL

## 2023-07-21 DIAGNOSIS — I10 BENIGN HYPERTENSION: ICD-10-CM

## 2023-07-21 DIAGNOSIS — Z01.818 PRE-OP EXAM: ICD-10-CM

## 2023-07-21 LAB
ANION GAP SERPL CALCULATED.3IONS-SCNC: 8.4 MMOL/L (ref 5–15)
BUN SERPL-MCNC: 21 MG/DL (ref 6–20)
BUN/CREAT SERPL: 22.3 (ref 7–25)
CALCIUM SPEC-SCNC: 9.3 MG/DL (ref 8.6–10.5)
CHLORIDE SERPL-SCNC: 106 MMOL/L (ref 98–107)
CO2 SERPL-SCNC: 27.6 MMOL/L (ref 22–29)
CREAT SERPL-MCNC: 0.94 MG/DL (ref 0.57–1)
DEPRECATED RDW RBC AUTO: 43.3 FL (ref 37–54)
EGFRCR SERPLBLD CKD-EPI 2021: 73.2 ML/MIN/1.73
ERYTHROCYTE [DISTWIDTH] IN BLOOD BY AUTOMATED COUNT: 12.7 % (ref 12.3–15.4)
GLUCOSE SERPL-MCNC: 79 MG/DL (ref 65–99)
HCT VFR BLD AUTO: 38.2 % (ref 34–46.6)
HGB BLD-MCNC: 12.8 G/DL (ref 12–15.9)
MCH RBC QN AUTO: 31.2 PG (ref 26.6–33)
MCHC RBC AUTO-ENTMCNC: 33.5 G/DL (ref 31.5–35.7)
MCV RBC AUTO: 93.2 FL (ref 79–97)
PLATELET # BLD AUTO: 267 10*3/MM3 (ref 140–450)
PMV BLD AUTO: 9.2 FL (ref 6–12)
POTASSIUM SERPL-SCNC: 3.8 MMOL/L (ref 3.5–5.2)
RBC # BLD AUTO: 4.1 10*6/MM3 (ref 3.77–5.28)
SODIUM SERPL-SCNC: 142 MMOL/L (ref 136–145)
WBC NRBC COR # BLD: 6.06 10*3/MM3 (ref 3.4–10.8)

## 2023-07-21 PROCEDURE — 85027 COMPLETE CBC AUTOMATED: CPT

## 2023-07-21 PROCEDURE — 36415 COLL VENOUS BLD VENIPUNCTURE: CPT

## 2023-07-21 PROCEDURE — 80048 BASIC METABOLIC PNL TOTAL CA: CPT

## 2023-08-04 ENCOUNTER — LAB REQUISITION (OUTPATIENT)
Dept: LAB | Facility: HOSPITAL | Age: 53
End: 2023-08-04
Payer: COMMERCIAL

## 2023-08-04 DIAGNOSIS — M19.072 PRIMARY OSTEOARTHRITIS, LEFT ANKLE AND FOOT: ICD-10-CM

## 2023-08-04 DIAGNOSIS — M25.572 PAIN IN LEFT ANKLE AND JOINTS OF LEFT FOOT: ICD-10-CM

## 2023-08-04 DIAGNOSIS — M77.52 OTHER ENTHESOPATHY OF LEFT FOOT AND ANKLE: ICD-10-CM

## 2023-08-04 PROCEDURE — 88305 TISSUE EXAM BY PATHOLOGIST: CPT | Performed by: PODIATRIST

## 2023-08-04 PROCEDURE — 88311 DECALCIFY TISSUE: CPT | Performed by: PODIATRIST

## 2023-08-07 LAB
LAB AP CASE REPORT: NORMAL
PATH REPORT.FINAL DX SPEC: NORMAL
PATH REPORT.GROSS SPEC: NORMAL

## 2023-11-09 ENCOUNTER — OFFICE VISIT (OUTPATIENT)
Dept: OBSTETRICS AND GYNECOLOGY | Facility: CLINIC | Age: 53
End: 2023-11-09
Payer: COMMERCIAL

## 2023-11-09 VITALS
BODY MASS INDEX: 28.29 KG/M2 | HEART RATE: 81 BPM | DIASTOLIC BLOOD PRESSURE: 77 MMHG | WEIGHT: 170 LBS | SYSTOLIC BLOOD PRESSURE: 115 MMHG

## 2023-11-09 DIAGNOSIS — Z01.419 WELL WOMAN EXAM WITH ROUTINE GYNECOLOGICAL EXAM: Primary | ICD-10-CM

## 2023-11-09 DIAGNOSIS — Z12.31 ENCOUNTER FOR SCREENING MAMMOGRAM FOR MALIGNANT NEOPLASM OF BREAST: ICD-10-CM

## 2023-11-09 RX ORDER — HYDROCODONE BITARTRATE AND ACETAMINOPHEN 5; 325 MG/1; MG/1
1 TABLET ORAL AS NEEDED
COMMUNITY
Start: 2023-08-04

## 2023-11-09 NOTE — PROGRESS NOTES
Well Woman Visit    CC: Scheduled annual well gyn visit  Chief Complaint   Patient presents with    Annual Exam       Myriad intake in the past?: No        Contraception:  Mirena IUD, Post menopausal, using no HRT    HPI:   53 y.o.       PCP: does manage PMHx and preventative labs  History: PMHx, Meds, Allergies, PSHx, Social Hx, and POBHx all reviewed and updated.    Pt has had Mirena IUD for the past 5 years.  Requesting removal    PHYSICAL EXAM:  /77   Pulse 81   Wt 77.1 kg (170 lb)   Breastfeeding No   BMI 28.29 kg/m²  Not found.  General- NAD, alert and oriented, appropriate  Psych- Normal mood, good memory  Neck- No masses, no thyroid enlargement  CV- Regular rhythm, no murnurs  Resp- CTA to bases, no wheezes  Abdomen- Soft, non distended, non tender, no masses    Breast left-  Bilaterally symmetrical, no masses, non tender, no nipple discharge  Breast right- Bilaterally symmetrical, no masses, non tender, no nipple discharge    External genitalia- Normal female, no lesions  Urethra/meatus- Normal, no masses, non tender  Bladder- Normal, no masses, non tender  Vagina- Normal, no atrophy, no lesions, no discharge.    Cvx- Normal, no lesions, no discharge, No cervical motion tenderness, IUD strings visable 2cm, IUD removed without difficulty, and intact and discarded  Uterus- Normal size, shape & consistency.  Non tender, mobile, & no prolapse  Adnexa- No mass, non tender  Anus/Rectum/Perineum- Not performed    Lymphatic- No palpable neck, axillary, or groin nodes  Ext- No edema, no cyanosis    Skin- No lesions, no rashes, no acanthosis nigricans      ASSESSMENT and PLAN:    Diagnoses and all orders for this visit:    1. Well woman exam with routine gynecological exam (Primary)    2. Encounter for screening mammogram for malignant neoplasm of breast  -     Mammo Screening Digital Tomosynthesis Bilateral With CAD; Future        Preventative:  BREAST HEALTH- Monthly self breast exam importance and  how to reviewed. MMG and/or MRI (prn) reviewed per society guidelines and her individual history. Screen: Pt will call to schedule  CERVICAL CANCER Screening- Reviewed current ASCCP guidelines for screening w and wo cotest HPV, age specific.  Screen: Already up to date  COLON CANCER Screening- Reviewed current medical society guidelines and options.  Screen:  Already up to date  Follow up PCP/Specialist PMHx and/or Labs      She understands the importance of having any ordered tests to be performed in a timely fashion.  The risks of not performing them include, but are not limited to, advanced cancer stages, bone loss from osteoporosis and/or subsequent increase in morbidity and/or mortality.  She is encouraged to review her results online and/or contact or office if she has questions.     Follow Up:  Return in about 1 year (around 11/9/2024) for Annual physical.            Marianela Flores MD  11/09/2023    [facility]  [address] [place] [address] [address] [place] [address]  Dept: [phone]  Dept Fax: [phone]  Loc: [phone]  Loc Fax: [phone]

## 2023-12-01 PROBLEM — Z97.5 IUD CONTRACEPTION: Status: ACTIVE | Noted: 2023-12-01

## 2024-01-19 ENCOUNTER — TELEPHONE (OUTPATIENT)
Dept: GASTROENTEROLOGY | Facility: CLINIC | Age: 54
End: 2024-01-19
Payer: COMMERCIAL

## 2024-01-19 NOTE — TELEPHONE ENCOUNTER
Caller: Priscilla Casanova    Relationship: Self    Best call back number: [phone]    Requested Prescriptions:   Requested Prescriptions      No prescriptions requested or ordered in this encounter    pantoprazole (PROTONIX) 40 MG EC tablet     Pharmacy where request should be sent:    [id number] [id number] [place] [address] AT [facility] & [place] - [phone] [facility] [phone]  [phone]       Last office visit with prescribing clinician: 2/6/2023   Last telemedicine visit with prescribing clinician: Visit date not found   Next office visit with prescribing clinician: 4/8/2024     Additional details provided by patient: NEEDS REFILL UP UNTIL APPOINTMENT    Does the patient have less than a 3 day supply:  [] Yes  [x] No    Would you like a call back once the refill request has been completed: [] Yes [x] No    If the office needs to give you a call back, can they leave a voicemail: [] Yes [x] No    Ann Fitzpatrick Rep   01/19/24 12:11 EST

## 2024-01-22 NOTE — TELEPHONE ENCOUNTER
Patient called. No answer. Left message questioning if she is taking Pantoprazole once or twice a day. Requested a call back to confirm.

## 2024-01-23 RX ORDER — PANTOPRAZOLE SODIUM 40 MG/1
40 TABLET, DELAYED RELEASE ORAL
Qty: 30 TABLET | Refills: 5 | Status: SHIPPED | OUTPATIENT
Start: 2024-01-23

## 2024-01-23 RX ORDER — PANTOPRAZOLE SODIUM 40 MG/1
40 TABLET, DELAYED RELEASE ORAL
Qty: 60 TABLET | Refills: 5 | Status: SHIPPED | OUTPATIENT
Start: 2024-01-23

## 2024-01-23 NOTE — TELEPHONE ENCOUNTER
Per My Chart:   Confirmation of Pantoprazole frequency.        I am taking pantoprazole twice a day.

## 2024-01-31 ENCOUNTER — TRANSCRIBE ORDERS (OUTPATIENT)
Dept: ADMINISTRATIVE | Facility: HOSPITAL | Age: 54
End: 2024-01-31
Payer: COMMERCIAL

## 2024-01-31 DIAGNOSIS — Z12.31 SCREENING MAMMOGRAM, ENCOUNTER FOR: Primary | ICD-10-CM

## 2024-02-02 ENCOUNTER — HOSPITAL ENCOUNTER (OUTPATIENT)
Dept: MAMMOGRAPHY | Facility: HOSPITAL | Age: 54
Discharge: HOME | End: 2024-02-02
Admitting: OBSTETRICS & GYNECOLOGY
Payer: COMMERCIAL

## 2024-02-02 DIAGNOSIS — Z12.31 SCREENING MAMMOGRAM, ENCOUNTER FOR: ICD-10-CM

## 2024-02-02 PROCEDURE — 77063 BREAST TOMOSYNTHESIS BI: CPT

## 2024-02-02 PROCEDURE — 77067 SCR MAMMO BI INCL CAD: CPT

## 2024-04-08 ENCOUNTER — OFFICE VISIT (OUTPATIENT)
Dept: GASTROENTEROLOGY | Facility: CLINIC | Age: 54
End: 2024-04-08
Payer: COMMERCIAL

## 2024-04-08 ENCOUNTER — TELEPHONE (OUTPATIENT)
Dept: GASTROENTEROLOGY | Facility: CLINIC | Age: 54
End: 2024-04-08

## 2024-04-08 VITALS
SYSTOLIC BLOOD PRESSURE: 123 MMHG | WEIGHT: 164.1 LBS | TEMPERATURE: 97.7 F | DIASTOLIC BLOOD PRESSURE: 79 MMHG | BODY MASS INDEX: 27.34 KG/M2 | HEIGHT: 65 IN | HEART RATE: 80 BPM

## 2024-04-08 DIAGNOSIS — K21.9 GASTROESOPHAGEAL REFLUX DISEASE WITHOUT ESOPHAGITIS: Primary | ICD-10-CM

## 2024-04-08 DIAGNOSIS — Z86.010 PERSONAL HISTORY OF COLONIC POLYPS: ICD-10-CM

## 2024-04-08 PROCEDURE — 99214 OFFICE O/P EST MOD 30 MIN: CPT | Performed by: INTERNAL MEDICINE

## 2024-04-08 NOTE — PROGRESS NOTES
Chief Complaint   Patient presents with    Heartburn     Subjective     HPI  Priscilla Casanova is a 53 y.o. female who presents today for office follow up.  Hx of GERD.  Currently managed with protonix 40mg/day.  Usually takes in evenings after dinner or before bedtime.  No significant day time sx.     Prior GI workup noted below:  EGD/colonsopy done 2022 for hx of dysphagia and for colon cancer screening.  EGD with a nonobstructing distal esophageal ring which was biopsied for disruption.  Biopsies from the distal and proximal esophagus did not show evidence of eosinophilic esophagitis.       Colonoscopy significant for a small tubular adenoma in the ascending colon.  There was some patchy inflammation in the descending colon and biopsies performed in multiple areas showed only some mild hyperplastic change without significant pathologic inflammation.          Objective   Vitals:    04/08/24 1514   BP: 123/79   Pulse: 80   Temp: 97.7 °F (36.5 °C)       Physical Exam  Vitals reviewed.   Constitutional:       Appearance: She is well-developed.   HENT:      Head: Normocephalic and atraumatic.   Neurological:      Mental Status: She is alert and oriented to person, place, and time.   Psychiatric:         Behavior: Behavior normal.         Thought Content: Thought content normal.         Judgment: Judgment normal.       The following data was reviewed by: Bayron Jarrett MD on 04/08/2024:  Common labs          7/21/2023    08:51 4/8/2024    15:35   Common Labs   Glucose 79  105    BUN 21  16    Creatinine 0.94  1.03    Sodium 142  141    Potassium 3.8  4.0    Chloride 106  106    Calcium 9.3  10.1    WBC 6.06     Hemoglobin 12.8     Hematocrit 38.2     Platelets 267       Data reviewed : GI studies as per HPI      Assessment & Plan   Assessment:     1. Gastroesophageal reflux disease without esophagitis    2. Personal history of colonic polyps       Plan:   Continue current dose Protonix, refills provided  Check  BMP today to monitor renal function  RTC 1 year  Recall colonoscopy 2027            Bayron Jarrett M.D.  [facility]  [address] [place] [address]  Office: [phone]

## 2024-04-09 LAB
BUN SERPL-MCNC: 16 MG/DL (ref 6–20)
BUN/CREAT SERPL: 15.5 (ref 7–25)
CALCIUM SERPL-MCNC: 10.1 MG/DL (ref 8.6–10.5)
CHLORIDE SERPL-SCNC: 106 MMOL/L (ref 98–107)
CO2 SERPL-SCNC: 26 MMOL/L (ref 22–29)
CREAT SERPL-MCNC: 1.03 MG/DL (ref 0.57–1)
EGFRCR SERPLBLD CKD-EPI 2021: 65.2 ML/MIN/1.73
GLUCOSE SERPL-MCNC: 105 MG/DL (ref 65–99)
POTASSIUM SERPL-SCNC: 4 MMOL/L (ref 3.5–5.2)
SODIUM SERPL-SCNC: 141 MMOL/L (ref 136–145)

## 2024-04-15 DIAGNOSIS — A60.04 HERPES, VULVOVAGINITIS: ICD-10-CM

## 2024-04-15 RX ORDER — ACYCLOVIR 400 MG/1
400 TABLET ORAL
Qty: 25 TABLET | Refills: 10 | Status: CANCELLED | OUTPATIENT
Start: 2024-04-15

## 2024-04-16 RX ORDER — ACYCLOVIR 400 MG/1
TABLET ORAL
Qty: 25 TABLET | Refills: 10 | Status: SHIPPED | OUTPATIENT
Start: 2024-04-16

## 2024-04-16 NOTE — TELEPHONE ENCOUNTER
Called patient left message.  Received refill request from pharmacy.  Sent request to Dr Flores.  Duplicate refill request Zovirax

## 2024-04-16 NOTE — TELEPHONE ENCOUNTER
Pharmacy requesting refill on Zovirax.  Last seen 11/9/23.  Last filled 3/8/23 Zovirax # 25 with 10 refills.  No appointment scheduled

## 2024-07-22 DIAGNOSIS — A60.04 HERPES, VULVOVAGINITIS: ICD-10-CM

## 2024-07-22 RX ORDER — ACYCLOVIR 400 MG/1
TABLET ORAL
Qty: 25 TABLET | Refills: 10 | Status: CANCELLED | OUTPATIENT
Start: 2024-07-22

## 2024-07-23 NOTE — TELEPHONE ENCOUNTER
Patient requested refill on Zovirax.  Last seen 11/9/23.  Last filled 4/16/23 Zovirax #25 with 10 refills.  No appointment scheduled.  Called patient informed her Rx already @ pharmacy

## 2024-10-31 ENCOUNTER — OFFICE VISIT (OUTPATIENT)
Dept: ORTHOPEDIC SURGERY | Facility: CLINIC | Age: 54
End: 2024-10-31
Payer: COMMERCIAL

## 2024-10-31 VITALS — HEIGHT: 65 IN | WEIGHT: 170.8 LBS | TEMPERATURE: 98.2 F | BODY MASS INDEX: 28.46 KG/M2

## 2024-10-31 DIAGNOSIS — M17.12 PRIMARY OSTEOARTHRITIS OF LEFT KNEE: Primary | ICD-10-CM

## 2024-10-31 DIAGNOSIS — M25.562 LEFT KNEE PAIN, UNSPECIFIED CHRONICITY: ICD-10-CM

## 2024-10-31 RX ADMIN — LIDOCAINE HYDROCHLORIDE 2 ML: 10 INJECTION, SOLUTION EPIDURAL; INFILTRATION; INTRACAUDAL; PERINEURAL at 16:20

## 2024-10-31 RX ADMIN — METHYLPREDNISOLONE ACETATE 1 ML: 80 INJECTION, SUSPENSION INTRA-ARTICULAR; INTRALESIONAL; INTRAMUSCULAR; SOFT TISSUE at 16:20

## 2024-10-31 NOTE — PROGRESS NOTES
"[facility] [place] [facility]              New Problem      Patient Name: Priscilla Casanova  : 1970  Primary Care Physician: Priyanka Rebolledo MD        Chief Complaint: Left knee pain     HPI:   Priscilla Casanova is a 54 y.o. year old who presents today for evaluation.  History of Present Illness  The patient presents for evaluation of knee pain.    She has been experiencing persistent knee discomfort since a meniscus tear/surgery that occurred 4 years ago. Despite receiving an injection from Dr. Dickerson in 2023, her knee has not returned to its normal state. She attempted to resume her regular exercise routine, but the pain forces her to stop. The discomfort is primarily located at the back of her knee, which also feels swollen. The pain is severe enough to disrupt her sleep. She also experiences a locking sensation in her knee after standing for extended periods. In , she underwent physical therapy and even completed a marathon in 2022, although with significant pain. She has tried diclofenac for pain relief but had to discontinue it due to a diagnosis of fatty liver.    Her most recent blood work, conducted on 10/09/2024, showed normal results, except for a low white blood cell count. She is currently attempting to maintain a fitness routine of running 2 miles on  and , cycling on , , and , and running 4 to 5 miles on Sundays.    Past Medical/Surgical, Social and Family History:  I have reviewed and/or updated pertinent history as noted in the medical record including:  Past Medical History:   Diagnosis Date    Abnormal Pap smear of cervix     Anesthesia complication     \"SLOW TO WAKE\"    Arthritis     LT KNEE    Herpes     Knee swelling     Limited joint range of motion     LEFT    Seasonal allergies     Tear of meniscus of knee 2020    Surgery 10/20    Torn medial meniscus 2020    LT KNEE     Past Surgical History:   Procedure Laterality Date    CERVICAL " BIOPSY  W/ LOOP ELECTRODE EXCISION  2005    COLONOSCOPY W/ POLYPECTOMY N/A 11/08/2022    Procedure: COLONOSCOPY to cecum;  Surgeon: Bayron Jarrett MD;  Location: [facility];  Service: Gastroenterology;  Laterality: N/A;  PRE - screening  POST - hemorrhoids, colitis, polyps    ENDOSCOPY N/A 01/06/2022    Procedure: ESOPHAGOGASTRODUODENOSCOPY FOR FOOD BOLUS;  Surgeon: Bayron Jarrett MD;  Location: [facility] MAIN OR;  Service: Gastroenterology;  Laterality: N/A;    ENDOSCOPY N/A 11/08/2022    Procedure: ESOPHAGOGASTRODUODENOSCOPY WITH BIOPSY;  Surgeon: Bayron Jarrett MD;  Location: [facility] END[facility]COPY;  Service: Gastroenterology;  Laterality: N/A;  PRE - dysphagia  POST - schatzki's ring    ESOPHAGEAL DILATATION      FOOT SURGERY Left 08/2023    KNEE ARTHROSCOPY Left 10/05/2020    Procedure: KNEE ARTHROSCOPY, PARTIAL MEDIAL MENISECTOMMY AND DEBRIDEMENT OF PATELLA;  Surgeon: Ila Dickerson MD;  Location: [facility];  Service: Orthopedics;  Laterality: Left;    WISDOM TOOTH EXTRACTION       Social History     Occupational History    Not on file   Tobacco Use    Smoking status: Never     Passive exposure: Never    Smokeless tobacco: Never   Vaping Use    Vaping status: Never Used   Substance and Sexual Activity    Alcohol use: Yes     Alcohol/week: 4.0 standard drinks of alcohol     Types: 2 Glasses of wine, 2 Cans of beer per week     Comment: OCCAS    Drug use: Never    Sexual activity: Yes     Partners: Male     Birth control/protection: I.U.D., Post-menopausal     Comment: mirena placed 2018          Allergies: No Known Allergies    Medications:   Home Medications:  Current Outpatient Medications on File Prior to Visit   Medication Sig    acyclovir (ZOVIRAX) 400 MG tablet TAKE 1 TABLET BY MOUTH EVERY 4 HOURS WHILE AWAKE. NO MORE THAN 5 DOSES A DAY    Azelastine-Fluticasone 137-50 MCG/ACT suspension 1 spray into the nostril(s) as directed by provider 2 (Two) Times a Day.    diclofenac  (VOLTAREN) 75 MG EC tablet Take 1 tablet by mouth Every 12 (Twelve) Hours.    HYDROcodone-acetaminophen (NORCO) 5-325 MG per tablet Take 1 tablet by mouth As Needed. for pain    hydrocortisone (ANUSOL-HC) 25 MG suppository Insert 1 suppository into the rectum At Night As Needed for Hemorrhoids (rectal discomfort/bleeding). (Patient taking differently: Insert 1 suppository into the rectum As Needed for Hemorrhoids (rectal discomfort/bleeding).)    levocetirizine (XYZAL) 5 MG tablet Take 1 tablet by mouth Every Evening.    MAGNESIUM PO Take  by mouth.    montelukast (SINGULAIR) 10 MG tablet Take 1 tablet by mouth Every Night.    Pseudoephedrine-Ibuprofen (COLD & SINUS PO) Take  by mouth.     No current facility-administered medications on file prior to visit.         ROS:  ROS negative except as listed in the HPI.    Physical Exam:   54 y.o. female  Body mass index is 28.42 kg/m²., 77.5 kg (170 lb 12.8 oz)  Vitals:    10/31/24 1605   Temp: 98.2 °F (36.8 °C)     General: Alert, cooperative, appears well and in no observable distress. Appears stated age and BMI as listed above.  HEENT: Normocephalic, atraumatic on external visual inspection.  CV: No significant peripheral edema.  Respiratory: Normal respiratory effort.  Skin: Warm & well perfused; appropriate skin turgor.  Psych: Appropriate mood & affect.  Neuro: Gross sensation and motor intact in affected extremity/extremities.  Vascular: Peripheral pulses palpable in affected extremity/extremities.   Physical Exam      MSK Exam:  Left Knee  No deformity or wounds appreciated. No significant redness or warmth.  Trace effusion noted  Tenderness along the joint line appreciated lateral compartment, patellofemoral compartment.  She is a little tenderness over the IT band.  ROM 0-130 with pain at terminal motion. +crepitus  Ligamentous exam grossly stable  Quad strength 4-4+/5    Right Knee  No deformity or wounds appreciated. No significant redness or warmth.  No  significant effusion noted.  No significant tenderness appreciated about the joint.  ROM 0-130 and painless.  Ligamentous exam grossly stable  Quad strength 4+ to 5/5    Brief hip exam in the affected extremity(ies) grossly unremarkable.  Moves ankle and toes up and down, no significant pain or swelling in the foot, ankle or calf.        Radiology:    The following X-rays were ordered/reviewed today to evaluate the patient's symptoms: Single Knee: AP standing and sunrise views of both knees, and lateral view of painful knee show Medial compartment narrowing on the left greater than the right with some lipping of the lateral condyles, she has a little bit of progressive patellofemoral arthrosis noted when compared with prior films from last year and also back in 2021.  Results      Procedure:   See Procedure Note: The potential risks and benefits of performing a diagnostic and therapeutic injection were discussed with the patient prior to procedure. Risks include, but are not limited to infection, swelling, transient increase in pain, bleeding, bruising. Patient was advised that injections are a diagnostic and therapeutic tool meaning they may not alleviate symptoms at all, or may only provide partial or temporary relief. Injection precautions and aftercare discussed.    Large Joint Arthrocentesis: L knee  Date/Time: 10/31/2024 4:20 PM  Consent given by: patient  Site marked: site marked  Timeout: Immediately prior to procedure a time out was called to verify the correct patient, procedure, equipment, support staff and site/side marked as required   Supporting Documentation  Indications: pain   Procedure Details  Location: knee - L knee  Preparation: Patient was prepped and draped in the usual sterile fashion  Needle gauge: 21G.  Medications administered: 1 mL methylPREDNISolone acetate 80 MG/ML; 2 mL lidocaine PF 1% 1 %  Patient tolerance: patient tolerated the procedure well with no immediate  complications        Misc. Data/Labs: N/A    Assessment & Plan:      ICD-10-CM ICD-9-CM   1. Primary osteoarthritis of left knee  M17.12 715.16   2. Left knee pain, unspecified chronicity  M25.562 719.46     No orders of the defined types were placed in this encounter.    Orders Placed This Encounter   Procedures    Large Joint Arthrocentesis: L knee    XR Knee 3 View Left    Ambulatory Referral to Physical Therapy for Evaluation & Treatment       Assessment & Plan  1. Knee pain.  The knee pain is likely due to arthritis and an irritated IT band. The pain is exacerbated by standing for extended periods and causes difficulty sleeping. A cortisone injection was administered today to address the inflammation from the arthritis. She was advised to rest for a few days before resuming cycling and to wait for 3 to 4 days before attempting to run again, starting with a short run. She was also advised to consult with her primary care physician before resuming diclofenac due to her history of elevated liver enzymes. If there is no improvement, a new MRI may be considered to rule out a new meniscus tear, and other options such as gel injections may be explored.  We will also get her back into physical therapy to work on stretching and strengthening.      Continue with activity modifications as needed/discussed.  Continue ICE and/or HEAT PRN.  Recommend to continue activity as tolerated, focus on quad and hip/core strengthening as well as gentle stretching.  Recommend lowering or maintaining BMI within a healthy range to reduce symptoms.   Patient encouraged to call with questions or concerns prior to follow up.  Will discuss with attending as needed.   Consider additional referrals, work up and/or advanced imaging as indicated or if patient fails to respond to conservative care.    Return if symptoms worsen or fail to improve.    Patient or patient representative verbalized consent for the use of Ambient Listening during the  visit with  MANOJ Campbell for chart documentation. 11/1/2024  12:40 EDT        MANOJ Knowles    Dictation software was used to complete a portion or all of this note.

## 2024-11-01 RX ORDER — LIDOCAINE HYDROCHLORIDE 10 MG/ML
2 INJECTION, SOLUTION EPIDURAL; INFILTRATION; INTRACAUDAL; PERINEURAL
Status: COMPLETED | OUTPATIENT
Start: 2024-10-31 | End: 2024-10-31

## 2024-11-01 RX ORDER — METHYLPREDNISOLONE ACETATE 80 MG/ML
1 INJECTION, SUSPENSION INTRA-ARTICULAR; INTRALESIONAL; INTRAMUSCULAR; SOFT TISSUE
Status: COMPLETED | OUTPATIENT
Start: 2024-10-31 | End: 2024-10-31

## 2024-11-01 RX ORDER — PANTOPRAZOLE SODIUM 40 MG/1
40 TABLET, DELAYED RELEASE ORAL
Qty: 30 TABLET | Refills: 5 | Status: SHIPPED | OUTPATIENT
Start: 2024-11-01

## 2024-11-22 ENCOUNTER — TELEPHONE (OUTPATIENT)
Dept: OBSTETRICS AND GYNECOLOGY | Facility: CLINIC | Age: 54
End: 2024-11-22

## 2024-11-22 NOTE — TELEPHONE ENCOUNTER
Caller: Priscilla Casanova    Relationship: Self    Best call back number: [phone] (home)     Who is your current provider: DR. WOODARD    Is your current provider offboarding? NO    Who would you like your new provider to be: KARINA MCWILLIAMS    What are your reasons for transferring care: MOVED TO [place]    Additional notes: PT NEEDING TO SCHEDULE ANNUAL EXAM

## 2025-01-22 ENCOUNTER — TELEPHONE (OUTPATIENT)
Dept: OBSTETRICS AND GYNECOLOGY | Age: 55
End: 2025-01-22
Payer: COMMERCIAL

## 2025-01-22 DIAGNOSIS — Z12.31 SCREENING MAMMOGRAM FOR BREAST CANCER: Primary | ICD-10-CM

## 2025-01-22 NOTE — TELEPHONE ENCOUNTER
Caller: Priscilla Casanova    Relationship: Self    Best call back number: [phone]    What was the call regarding: PT WOULD LIKE TO SCHEDULE A MAMMOGRAM      Assessment & Plan  Problem List Items Addressed This Visit        Psychiatric    Adjustment disorder with anxious mood (Chronic)    Overview     GAD7 score of 14 at onset of therapy         Relevant Medications    clonazePAM (KLONOPIN) 0.5 MG tablet    sertraline (ZOLOFT) 50 MG tablet       Cardiac/Vascular    Essential (primary) hypertension - Primary (Chronic)    Relevant Medications    hydroCHLOROthiazide (HYDRODIURIL) 25 MG tablet    amLODIPine (NORVASC) 10 MG tablet       Oncology    RESOLVED: Iron deficiency anemia due to chronic blood loss (Chronic)    Overview     Resolved after hysterectomy            Other    Tobacco dependency (Chronic)    Current Assessment & Plan     I counseled the patient on smoking cessation, risks associated with smoking, having a quit date, nicotine replacement, medications that can aid in cessation, and having a plan for future cravings.  The patient stated that she is working on total cessation           Other Visit Diagnoses     Routine medical exam    -  Discussed healthy diet, regular exercise, necessary labs, age appropriate cancer screening, and routine vaccinations.       Relevant Orders    Lipid panel    TSH    Hepatic function panel    Hot flashes    -  Unclear etiology check FSH    Relevant Orders    Follicle stimulating hormone            Health Maintenance reviewed, up to date.    Follow-up: Follow-up in about 2 weeks (around 3/27/2019) for Nurse BP check.  ______________________________________________________________________    Chief Complaint  Chief Complaint   Patient presents with    Annual Exam       HPI  Sherrill Ennis is a 41 y.o. female with medical diagnoses as listed in the medical history and problem list that presents for routine physical.  Pt is known to me with her last appointment 10/12/2018.      She reports that she has recovered well from her incisional hernia surgery.  Home BPs sometimes running 160s/100s.  No CP, SOB, palpitations. She  is taking amlodipine 5mg.        PAST MEDICAL HISTORY:  Past Medical History:   Diagnosis Date    Anxiety     Hypertension     Iron deficiency anemia due to chronic blood loss 2018    Resolved after hysterectomy    PONV (postoperative nausea and vomiting)        PAST SURGICAL HISTORY:  Past Surgical History:   Procedure Laterality Date     SECTION      x4    CHOLECYSTECTOMY      CYSTOGRAM N/A 2018    [facility]    CYSTOGRAM  2018    Performed [facility] at [facility]    CYSTOSCOPY, WITH RETROGRADE pyelLOGRAM Bilateral 2018    Performed by [facility] at [facility]    CYSTOSCOPY, WITH RETROGRADE PYELOGRAM Bilateral 2018    Performed by [facility] at [facility]    EXCISION, CYST, OVARY, LAPAROSCOPIC Right 2018    Performed by Vince Garcia [facility] at [facility]    HERNIA REPAIR      HYSTERECTOMY  2018    [id number] N/A 2018    Performed by [facility] at [facility]    LAPAROTOMY,EXPLORATORY  2018    Performed by [facility] Le [facility] at [facility]    REPAIR, BLADDER N/A 2018    Performed by [facility] Le [facility] at [facility]    REPAIR, HERNIA, INCISIONAL OR VENTRAL, WITHOUT HISTORY OF PRIOR REPAIR N/A 2019    Performed by Estevan Barreto MD at [facility]    SALPINGECTOMY, LAPAROSCOPIC Bilateral 2018    Performed by Vince [facility] at [facility]    TUBAL LIGATION         SOCIAL HISTORY:  Social History     Socioeconomic History    Marital status:      Spouse name: Not on file    Number of children: Not on file    Years of education: Not on file    Highest education level: Not on file   Social Needs    Financial resource strain: Not on file    Food insecurity - worry: Not on file    Food insecurity - inability: Not on file    Transportation needs - medical: Not on file    Transportation needs - non-medical: Not on file   Occupational History     Not on file   Tobacco Use    Smoking status: Former Smoker     Packs/day: 0.00     Years: 17.00     Pack years: 0.00     Types: Cigarettes     Last attempt to quit: 2018     Years since quittin.7    Smokeless tobacco: Never Used   Substance and Sexual Activity    Alcohol use: Yes     Comment: Occasional    Drug use: No    Sexual activity: Not Currently     Partners: Male     Comment:    Other Topics Concern    Not on file   Social History Narrative     since     Together since     He drives 18-wheelers    She is the  for a dental office    Previously  and     Lives with her  and youngest daughter.     with three daughters from previous marriage also       FAMILY HISTORY:  Family History   Problem Relation Age of Onset    Hypertension Mother     Stroke Mother     Aneurysm Mother     Hypertension Father     Hypertension Brother     Hypertension Maternal Grandfather     Heart disease Maternal Grandfather         MI    Cancer Maternal Grandfather     Cancer Paternal Grandmother        ALLERGIES AND MEDICATIONS: updated and reviewed.  Review of patient's allergies indicates:   Allergen Reactions    Ace inhibitors Other (See Comments)     cough     Current Outpatient Medications   Medication Sig Dispense Refill    amLODIPine (NORVASC) 10 MG tablet Take 0.5 tablets (5 mg total) by mouth once daily. 90 tablet 3    clonazePAM (KLONOPIN) 0.5 MG tablet Take 1 tablet (0.5 mg total) by mouth 2 (two) times daily as needed for Anxiety. 10 tablet 0    hydroCHLOROthiazide (HYDRODIURIL) 25 MG tablet Take 1 tablet (25 mg total) by mouth once daily. 90 tablet 3    pantoprazole (PROTONIX) 40 MG tablet TAKE 1 TABLET BY MOUTH ONCE DAILY 90 tablet 0    promethazine (PHENERGAN) 25 MG tablet Take 1 tablet (25 mg total) by mouth every 4 (four) hours. 30 tablet 0    sertraline (ZOLOFT) 50 MG tablet Take 1 tablet (50 mg total) by mouth once daily. 90  "tablet 3    valACYclovir (VALTREX) 1000 MG tablet Take 1 tablet (1,000 mg total) by mouth daily as needed (herpes outbreak). For 5 days 30 tablet 1     No current facility-administered medications for this visit.          ROS  Review of Systems   Constitutional: Negative for activity change, chills, fever and unexpected weight change.   HENT: Negative for congestion, ear pain, hearing loss, rhinorrhea, sore throat and trouble swallowing.    Eyes: Negative for discharge, redness and visual disturbance.   Respiratory: Negative for cough, chest tightness, shortness of breath and wheezing.    Cardiovascular: Negative for chest pain, palpitations and leg swelling.   Gastrointestinal: Negative for abdominal pain, blood in stool, constipation, diarrhea, nausea and vomiting.   Endocrine: Negative for polydipsia, polyphagia and polyuria.   Genitourinary: Negative for decreased urine volume, difficulty urinating, dysuria, hematuria and menstrual problem.   Musculoskeletal: Positive for arthralgias and joint swelling. Negative for myalgias and neck pain.   Skin: Negative for color change and rash.   Neurological: Positive for headaches. Negative for dizziness, weakness and light-headedness.   Psychiatric/Behavioral: Negative for confusion, decreased concentration, dysphoric mood, sleep disturbance and suicidal ideas.           Physical Exam  Vitals:    03/13/19 0710 03/13/19 0743   BP: (!) 128/98 (!) 122/90   BP Location: Left arm    Patient Position: Sitting    BP Method: Medium (Manual)    Pulse: 94    Temp: 98.4 °F (36.9 °C)    TempSrc: Oral    SpO2: 98%    Weight: 92.3 kg (203 lb 6 oz)    Height: 5' 4" (1.626 m)     Body mass index is 34.91 kg/m².  Weight: 92.3 kg (203 lb 6 oz)   Height: 5' 4" (162.6 cm)   Physical Exam   Constitutional: She is oriented to person, place, and time. She appears well-developed and well-nourished. No distress.   HENT:   Head: Normocephalic and atraumatic.   Right Ear: Tympanic membrane, " external ear and ear canal normal.   Left Ear: Tympanic membrane, external ear and ear canal normal.   Nose: Nose normal. Right sinus exhibits no maxillary sinus tenderness and no frontal sinus tenderness. Left sinus exhibits no maxillary sinus tenderness and no frontal sinus tenderness.   Mouth/Throat: Uvula is midline, oropharynx is clear and moist and mucous membranes are normal. No tonsillar exudate.   Eyes: Conjunctivae, EOM and lids are normal. Pupils are equal, round, and reactive to light. No scleral icterus.   Neck: Full passive range of motion without pain. Neck supple. No JVD present. No spinous process tenderness and no muscular tenderness present. Carotid bruit is not present. No thyromegaly present.   Cardiovascular: Normal rate, regular rhythm, S1 normal, S2 normal and intact distal pulses. Exam reveals no S3, no S4 and no friction rub.   No murmur heard.  Pulmonary/Chest: Effort normal and breath sounds normal. She has no wheezes. She has no rhonchi. She has no rales.   Abdominal: Soft. Bowel sounds are normal. She exhibits no distension. There is no hepatosplenomegaly. There is no tenderness. There is no rebound and no CVA tenderness.   Musculoskeletal: Normal range of motion. She exhibits no edema or tenderness.   Lymphadenopathy:        Head (right side): No submental and no submandibular adenopathy present.        Head (left side): No submental and no submandibular adenopathy present.     She has no cervical adenopathy.   Neurological: She is alert and oriented to person, place, and time. Coordination normal.   Motor grossly intact.  Sensory grossly intact.  Symmetric facial movements palate elevated symmetrically tongue midline     Skin: Skin is warm and dry. No rash noted. No cyanosis. Nails show no clubbing.   Psychiatric: She has a normal mood and affect. Her speech is normal and behavior is normal. Thought content normal. Cognition and memory are normal.           Health Maintenance        Date Due Completion Date    Mammogram 03/08/2020 3/8/2018    TETANUS VACCINE 09/12/2028 9/12/2018

## 2025-01-22 NOTE — TELEPHONE ENCOUNTER
1/22/2025 Pt called for mammogram order. Order placed and pt will call [facility] scheduling to schedule at [place]

## 2025-02-17 ENCOUNTER — HOSPITAL ENCOUNTER (OUTPATIENT)
Dept: MAMMOGRAPHY | Facility: HOSPITAL | Age: 55
Discharge: HOME | End: 2025-02-17
Admitting: STUDENT IN AN ORGANIZED HEALTH CARE EDUCATION/TRAINING PROGRAM
Payer: COMMERCIAL

## 2025-02-17 DIAGNOSIS — Z12.31 SCREENING MAMMOGRAM FOR BREAST CANCER: ICD-10-CM

## 2025-02-17 PROCEDURE — 77063 BREAST TOMOSYNTHESIS BI: CPT

## 2025-02-17 PROCEDURE — 77067 SCR MAMMO BI INCL CAD: CPT

## 2025-03-28 DIAGNOSIS — A60.04 HERPES, VULVOVAGINITIS: ICD-10-CM

## 2025-03-28 RX ORDER — ACYCLOVIR 400 MG/1
400 TABLET ORAL
Qty: 25 TABLET | Refills: 2 | Status: SHIPPED | OUTPATIENT
Start: 2025-03-28

## 2025-03-28 NOTE — TELEPHONE ENCOUNTER
Sulema'd refill on Valtrex, did inform patient through VM that she will need to schedule her annual visit prior to further refills.

## 2025-05-01 ENCOUNTER — OFFICE VISIT (OUTPATIENT)
Dept: OBSTETRICS AND GYNECOLOGY | Age: 55
End: 2025-05-01
Payer: COMMERCIAL

## 2025-05-01 VITALS
WEIGHT: 162 LBS | SYSTOLIC BLOOD PRESSURE: 114 MMHG | BODY MASS INDEX: 26.99 KG/M2 | DIASTOLIC BLOOD PRESSURE: 80 MMHG | HEIGHT: 65 IN

## 2025-05-01 DIAGNOSIS — R79.89 ELEVATED SERUM HCG: ICD-10-CM

## 2025-05-01 DIAGNOSIS — Z12.4 SCREENING FOR CERVICAL CANCER: Primary | ICD-10-CM

## 2025-05-01 DIAGNOSIS — A60.04 HERPES, VULVOVAGINITIS: ICD-10-CM

## 2025-05-01 DIAGNOSIS — Z12.31 SCREENING MAMMOGRAM FOR BREAST CANCER: Primary | ICD-10-CM

## 2025-05-01 RX ORDER — ACYCLOVIR 400 MG/1
400 TABLET ORAL
Qty: 25 TABLET | Refills: 4 | Status: SHIPPED | OUTPATIENT
Start: 2025-05-01

## 2025-05-01 NOTE — PROGRESS NOTES
"[facility]   Obstetrics and Gynecology     2025    Patient: Priscilla Casanova          MR#:[id number]    History of Present Illness    Chief Complaint   Patient presents with    Gynecologic Exam     CC: new gyn. Annual. Last pap 22 (-). Last mg 25. Last colonoscopy 22. Pt needs  refill on acyclovir.        54 y.o. female  who presents for annual exam. Has a history of elevated bhcg even post-menopausally. States she has been consistently testing positive on both blood and urine tests for many years and used to carry a letter stating that she was not pregnant despite testing.     Relevant data reviewed:    No LMP recorded. Patient is postmenopausal.  Obstetric History:  OB History          2    Para   2    Term   2       0    AB   0    Living   2         SAB   0    IAB   0    Ectopic   0    Molar   0    Multiple        Live Births   2               Menstrual History:     No LMP recorded. Patient is postmenopausal.       Social History     Substance and Sexual Activity   Sexual Activity Yes    Partners: Male    Birth control/protection: Post-menopausal     ______________________________________  Patient Active Problem List   Diagnosis    Tear of medial meniscus of left knee, current    Anemia    GERD (gastroesophageal reflux disease)    STD (female)    Seasonal allergic rhinitis    Esophageal dysphagia    IUD check up    IUD contraception     Past Medical History:   Diagnosis Date    Abnormal Pap smear of cervix     Anesthesia complication     \"SLOW TO WAKE\"    Arthritis     LT KNEE    Herpes     Knee swelling     Limited joint range of motion     LEFT    Seasonal allergies     Tear of meniscus of knee 2020    Surgery 10/20    Torn medial meniscus 2020    LT KNEE     Past Surgical History:   Procedure Laterality Date    CERVICAL BIOPSY  W/ LOOP ELECTRODE EXCISION      COLONOSCOPY W/ POLYPECTOMY N/A 2022    Procedure: COLONOSCOPY to cecum;  " Surgeon: Bayron Jarrett MD;  Location: [facility] ENDOSCOPY;  Service: Gastroenterology;  Laterality: N/A;  PRE - screening  POST - hemorrhoids, colitis, polyps    ENDOSCOPY N/A 01/06/2022    Procedure: ESOPHAGOGASTRODUODENOSCOPY FOR FOOD BOLUS;  Surgeon: Bayron Jarrett MD;  Location: [facility] MAIN OR;  Service: Gastroenterology;  Laterality: N/A;    ENDOSCOPY N/A 11/08/2022    Procedure: ESOPHAGOGASTRODUODENOSCOPY WITH BIOPSY;  Surgeon: Bayron Jarrett MD;  Location: [facility] ENDOSCOPY;  Service: Gastroenterology;  Laterality: N/A;  PRE - dysphagia  POST - schatzki's ring    ESOPHAGEAL DILATATION      FOOT SURGERY Left 08/2023    KNEE ARTHROSCOPY Left 10/05/2020    Procedure: KNEE ARTHROSCOPY, PARTIAL MEDIAL MENISECTOMMY AND DEBRIDEMENT OF PATELLA;  Surgeon: Ila Dickerson MD;  Location:  [facility];  Service: Orthopedics;  Laterality: Left;    WISDOM TOOTH EXTRACTION       Social History     Tobacco Use   Smoking Status Never    Passive exposure: Never   Smokeless Tobacco Never     Family History   Problem Relation Age of Onset    Esophageal cancer Father     Cancer Father         Esophageal    Esophageal cancer Other     Hyperlipidemia Other     Hypertension Other     Diabetes Other     Osteoporosis Other     Scoliosis Mother     Malig Hyperthermia Neg Hx     Ovarian cancer Neg Hx     Uterine cancer Neg Hx     Breast cancer Neg Hx     Colon cancer Neg Hx     Prostate cancer Neg Hx      Prior to Admission medications    Medication Sig Start Date End Date Taking? Authorizing Provider   acyclovir (ZOVIRAX) 400 MG tablet Take 1 tablet by mouth Every 4 (Four) Hours While Awake. Take no more than 5 doses a day. 3/28/25  Yes Marianela Flores MD   Azelastine-Fluticasone 137-50 MCG/ACT suspension 1 spray into the nostril(s) as directed by provider 2 (Two) Times a Day.  Patient not taking: Reported on 5/1/2025 6/22/24   Michael Moreira MD   diclofenac (VOLTAREN) 75 MG EC tablet Take 1 tablet by mouth Every  12 (Twelve) Hours. 1/6/23  Yes Kuldip Ramírez MD   HYDROcodone-acetaminophen (NORCO) 5-325 MG per tablet Take 1 tablet by mouth As Needed. for pain  Patient not taking: Reported on 5/1/2025 8/4/23   Kuldip Ramírez MD   hydrocortisone (ANUSOL-HC) 25 MG suppository Insert 1 suppository into the rectum At Night As Needed for Hemorrhoids (rectal discomfort/bleeding).  Patient not taking: Reported on 5/1/2025 2/6/23   Bayron Jarrett MD   levocetirizine (XYZAL) 5 MG tablet Take 1 tablet by mouth Every Evening.  Patient not taking: Reported on 5/1/2025 6/22/24   Michael Moreira MD   MAGNESIUM PO Take  by mouth.   Yes Kuldip Ramírez MD   montelukast (SINGULAIR) 10 MG tablet Take 1 tablet by mouth Every Night. 6/22/24  Yes Michael Moreira MD   pantoprazole (PROTONIX) 40 MG EC tablet Take 1 tablet by mouth 2 (Two) Times a Day Before Meals. 11/1/24  Yes Bayron Jarrett MD   Pseudoephedrine-Ibuprofen (COLD & SINUS PO) Take  by mouth.    ProviderKuldip MD     _______________________________________    Current contraception: post menopausal status  History of abnormal Pap smear: yes - 2008 > had cold knife cone   Family history of uterine or ovarian cancer: no  Family History of colon cancer/colon polyps: no  History of abnormal mammogram: no  History of abnormal lipids: no    The following portions of the patient's history were reviewed and updated as appropriate: allergies, current medications, past family history, past medical history, past social history, past surgical history, and problem list.        Pertinent items are noted in HPI.       Objective   Physical Exam  Vitals and nursing note reviewed. Exam conducted with a chaperone present.   Constitutional:       Appearance: Normal appearance.   HENT:      Head: Normocephalic and atraumatic.   Pulmonary:      Effort: Pulmonary effort is normal.   Chest:   Breasts:     Right: Normal.      Left: Normal.   Abdominal:      General: Abdomen is  "flat.      Palpations: Abdomen is soft.   Genitourinary:     Exam position: Lithotomy position.      Labia:         Right: No rash or lesion.         Left: No rash or lesion.       Vagina: Normal. No vaginal discharge or lesions.      Cervix: Normal. No lesion.      Uterus: Normal. Not tender.       Adnexa: Right adnexa normal and left adnexa normal.        Right: No mass or tenderness.          Left: No mass or tenderness.     Lymphadenopathy:      Upper Body:      Right upper body: No supraclavicular, axillary or pectoral adenopathy.      Left upper body: No supraclavicular, axillary or pectoral adenopathy.   Skin:     General: Skin is warm and dry.   Neurological:      Mental Status: She is alert and oriented to person, place, and time.   Psychiatric:         Mood and Affect: Mood normal.         /80   Ht 165 cm (64.96\")   Wt 73.5 kg (162 lb)   BMI 26.99 kg/m²    BP Readings from Last 3 Encounters:   25 114/80   24 136/99   24 123/79      Wt Readings from Last 3 Encounters:   25 73.5 kg (162 lb)   10/31/24 77.5 kg (170 lb 12.8 oz)   24 74.4 kg (164 lb 0.4 oz)        BMI: Estimated body mass index is 26.99 kg/m² as calculated from the following:    Height as of this encounter: 165 cm (64.96\").    Weight as of this encounter: 73.5 kg (162 lb).    As part of wellness and prevention, the following topics were discussed with the patient:  Encouraged self breast exam  Sexual transmitted disease prevention   Dental health discussed  Vaccinations/immunizations addressed.           Problem List   Meds  History  Prep for Surg   Imagin}    Assessment:  54 y.o. female  who presents for annual exam.  Diagnoses and all orders for this visit:    1. Screening for cervical cancer (Primary)  -     IGP, Apt HPV,rfx 16 / 18,45    2. Herpes, vulvovaginitis  -     acyclovir (ZOVIRAX) 400 MG tablet; Take 1 tablet by mouth Every 4 (Four) Hours While Awake. Take no more than 5 doses a " day.  Dispense: 25 tablet; Refill: 4    3. Elevated serum hCG  Comments:  - Will retest today, if still elevated will refer to Gyn Onc for further workup and recommendations.  Orders:  -     HCG, B-subunit, Quantitative    - Pap collected today  - Mammo/Colonoscopy up to date  - Vaccine recs reviewed  - STI screening declined       Plan:  Return in about 1 year (around 5/1/2026).    Chen Bobby MD  5/1/2025 15:54 EDT

## 2025-05-02 DIAGNOSIS — R79.89 ELEVATED SERUM HCG IN FEMALE, NOT PREGNANT: Primary | ICD-10-CM

## 2025-05-02 LAB — HCG INTACT+B SERPL-ACNC: 8.9 MIU/ML

## 2025-05-05 LAB
CYTOLOGIST CVX/VAG CYTO: NORMAL
CYTOLOGY CVX/VAG DOC CYTO: NORMAL
CYTOLOGY CVX/VAG DOC THIN PREP: NORMAL
DX ICD CODE: NORMAL
HPV I/H RISK 4 DNA CVX QL PROBE+SIG AMP: NEGATIVE
OTHER STN SPEC: NORMAL
SERVICE CMNT-IMP: NORMAL
STAT OF ADQ CVX/VAG CYTO-IMP: NORMAL

## 2025-06-26 NOTE — PROGRESS NOTES
New Knee      Patient: Priscilla Casanova        YOB: 1970    Medical Record Number: [id number]        Chief Complaints: Left knee pain      History of Present Illness: This is a 54-year-old female who presents complaining of left knee pain [facility] last saw her in October I have not seen her since 2023 she is trying to run it 1 more marathon she tried to run 8 miles this past weekend but I think only made 6 she is doing a lot of crosstraining as well        Allergies: No Known Allergies    Medications:   Home Medications:  Current Outpatient Medications on File Prior to Visit   Medication Sig    acyclovir (ZOVIRAX) 400 MG tablet Take 1 tablet by mouth Every 4 (Four) Hours While Awake. Take no more than 5 doses a day.    diclofenac (VOLTAREN) 75 MG EC tablet Take 1 tablet by mouth Every 12 (Twelve) Hours.    MAGNESIUM PO Take  by mouth.    montelukast (SINGULAIR) 10 MG tablet Take 1 tablet by mouth Every Night.    pantoprazole (PROTONIX) 40 MG EC tablet Take 1 tablet by mouth 2 (Two) Times a Day Before Meals.    Azelastine-Fluticasone 137-50 MCG/ACT suspension 1 spray into the nostril(s) as directed by provider 2 (Two) Times a Day. (Patient not taking: Reported on 7/14/2025)    HYDROcodone-acetaminophen (NORCO) 5-325 MG per tablet Take 1 tablet by mouth As Needed. for pain (Patient not taking: Reported on 7/14/2025)    hydrocortisone (ANUSOL-HC) 25 MG suppository Insert 1 suppository into the rectum At Night As Needed for Hemorrhoids (rectal discomfort/bleeding). (Patient not taking: Reported on 7/14/2025)    levocetirizine (XYZAL) 5 MG tablet Take 1 tablet by mouth Every Evening. (Patient not taking: Reported on 7/14/2025)    Pseudoephedrine-Ibuprofen (COLD & SINUS PO) Take  by mouth.     No current facility-administered medications on file prior to visit.     Current Medications:  Scheduled Meds:  Continuous Infusions:No current facility-administered medications for this visit.    PRN Meds:.    Past  "Medical History:   Diagnosis Date    Abnormal Pap smear of cervix     Anesthesia complication     \"SLOW TO WAKE\"    Arthritis     LT KNEE    Herpes     Knee swelling     Limited joint range of motion     LEFT    Seasonal allergies     Tear of meniscus of knee June 2020    Surgery 10/20    Torn medial meniscus 07/2020    LT KNEE        Past Surgical History:   Procedure Laterality Date    CERVICAL BIOPSY  W/ LOOP ELECTRODE EXCISION  2005    COLONOSCOPY W/ POLYPECTOMY N/A 11/08/2022    Procedure: COLONOSCOPY to cecum;  Surgeon: Bayron Jarrett MD;  Location: [facility] ENDOSCOPY;  Service: Gastroenterology;  Laterality: N/A;  PRE - screening  POST - hemorrhoids, colitis, polyps    ENDOSCOPY N/A 01/06/2022    Procedure: ESOPHAGOGASTRODUODENOSCOPY FOR FOOD BOLUS;  Surgeon: Bayron Jarrett MD;  Location: [facility] MAIN OR;  Service: Gastroenterology;  Laterality: N/A;    ENDOSCOPY N/A 11/08/2022    Procedure: ESOPHAGOGASTRODUODENOSCOPY WITH BIOPSY;  Surgeon: Bayron Jarrett MD;  Location: [facility] ENDOSCOPY;  Service: Gastroenterology;  Laterality: N/A;  PRE - dysphagia  POST - schatzki's ring    ESOPHAGEAL DILATATION      FOOT SURGERY Left 08/2023    KNEE ARTHROSCOPY Left 10/05/2020    Procedure: KNEE ARTHROSCOPY, PARTIAL MEDIAL MENISECTOMMY AND DEBRIDEMENT OF PATELLA;  Surgeon: Ila Dickerson MD;  Location: [facility] OR [facility];  Service: Orthopedics;  Laterality: Left;    WISDOM TOOTH EXTRACTION          Social History     Occupational History    Not on file   Tobacco Use    Smoking status: Never     Passive exposure: Never    Smokeless tobacco: Never   Vaping Use    Vaping status: Never Used   Substance and Sexual Activity    Alcohol use: Yes     Alcohol/week: 4.0 standard drinks of alcohol     Types: 2 Glasses of wine, 2 Cans of beer per week     Comment: OCCAS    Drug use: Never    Sexual activity: Yes     Partners: Male     Birth control/protection: Post-menopausal      Social History     Social History " "Narrative    ** Merged History Encounter **             Family History   Problem Relation Age of Onset    Esophageal cancer Father     Cancer Father         Esophageal    Esophageal cancer Other     Hyperlipidemia Other     Hypertension Other     Diabetes Other     Osteoporosis Other     Scoliosis Mother     Malig Hyperthermia Neg Hx     Ovarian cancer Neg Hx     Uterine cancer Neg Hx     Breast cancer Neg Hx     Colon cancer Neg Hx     Prostate cancer Neg Hx              Review of Systems:     Review of Systems      Physical Exam: 54 y.o. female  General Appearance:    Alert, cooperative, in no acute distress                   Vitals:    07/14/25 0908   Temp: 97.5 °F (36.4 °C)   TempSrc: Temporal   Weight: 71.2 kg (157 lb)   Height: 165.1 cm (65\")   PainSc: 7    PainLoc: Knee      Patient is alert and read ×3 no acute distress appears her above-listed at height weight and age.  Affect is normal respiratory rate is normal unlabored. Heart rate regular rate rhythm, sclera, dentition and hearing are normal for the purpose of this exam.        Ortho Exam  Physical exam of the left knee reveals no effusion, no erythema.  It mild loss of extension and full flexion  Patient has mild varus alignment.  They have mild tenderness to palpation about the medial compartment, no tenderness laterally..  The patient has a negative bounce home, negative Sona and a stable ligamentous exam.  Quad tone is reasonable and symmetric.  There are no overlying skin changes no lymphedema no lymphadenopathy.  There is good hip range of motion which is full symmetric and asymptomatic and a normal ankle exam.     Large Joint Arthrocentesis: L knee  Date/Time: 7/14/2025 9:09 AM  Consent given by: patient  Site marked: site marked  Timeout: Immediately prior to procedure a time out was called to verify the correct patient, procedure, equipment, support staff and site/side marked as required   Supporting Documentation  Indications: pain "   Procedure Details  Location: knee - L knee  Preparation: Patient was prepped and draped in the usual sterile fashion  Needle gauge: 21G.  Approach: anteromedial  Medications administered: 1 mL methylPREDNISolone acetate 80 MG/ML; 2 mL lidocaine PF 1% 1 %  Patient tolerance: patient tolerated the procedure well with no immediate complications                 Radiology:   AP, Lateral and merchant views of the Left knee  were/reviewed to evauateknee pain.  Did review these from October I did do a 30 degree PA flexion view today these were compared x-rays done in 2023 she has had  just a little progression of her arthritis with narrowing of the joint space medially  Imaging Results (Most Recent)       Procedure Component Value Units Date/Time    XR Knee 1 or 2 View Left [[id number]] Resulted: 07/14/25 0929     Updated: 07/14/25 0929    Impression:      Ordering physician's impression is located in the Encounter Note dated 07/14/25. X-ray performed in the DR room.          Assessment/Plan:      Left knee pain this is degenerative in origin had a long discussion with her regarding the marathon I think she would do better if she did a half marathon.  I told her it is easier to cross train doing a half then a follow-up think she has excepted that and understands I think it is reasonable to inject her today.  If it is just injections that she wants in the future we will have her see Ambrose

## 2025-07-14 ENCOUNTER — OFFICE VISIT (OUTPATIENT)
Dept: ORTHOPEDIC SURGERY | Facility: CLINIC | Age: 55
End: 2025-07-14
Payer: COMMERCIAL

## 2025-07-14 VITALS — TEMPERATURE: 97.5 F | WEIGHT: 157 LBS | HEIGHT: 65 IN | BODY MASS INDEX: 26.16 KG/M2

## 2025-07-14 DIAGNOSIS — M17.12 PRIMARY OSTEOARTHRITIS OF LEFT KNEE: ICD-10-CM

## 2025-07-14 DIAGNOSIS — R52 PAIN: Primary | ICD-10-CM

## 2025-07-14 PROCEDURE — 99213 OFFICE O/P EST LOW 20 MIN: CPT | Performed by: ORTHOPAEDIC SURGERY

## 2025-07-14 PROCEDURE — 20610 DRAIN/INJ JOINT/BURSA W/O US: CPT | Performed by: ORTHOPAEDIC SURGERY

## 2025-07-14 PROCEDURE — 73560 X-RAY EXAM OF KNEE 1 OR 2: CPT | Performed by: ORTHOPAEDIC SURGERY

## 2025-07-14 RX ORDER — METHYLPREDNISOLONE ACETATE 80 MG/ML
1 INJECTION, SUSPENSION INTRA-ARTICULAR; INTRALESIONAL; INTRAMUSCULAR; SOFT TISSUE
Status: COMPLETED | OUTPATIENT
Start: 2025-07-14 | End: 2025-07-14

## 2025-07-14 RX ORDER — LIDOCAINE HYDROCHLORIDE 10 MG/ML
2 INJECTION, SOLUTION EPIDURAL; INFILTRATION; INTRACAUDAL; PERINEURAL
Status: COMPLETED | OUTPATIENT
Start: 2025-07-14 | End: 2025-07-14

## 2025-07-14 RX ADMIN — LIDOCAINE HYDROCHLORIDE 2 ML: 10 INJECTION, SOLUTION EPIDURAL; INFILTRATION; INTRACAUDAL; PERINEURAL at 09:09

## 2025-07-14 RX ADMIN — METHYLPREDNISOLONE ACETATE 1 ML: 80 INJECTION, SUSPENSION INTRA-ARTICULAR; INTRALESIONAL; INTRAMUSCULAR; SOFT TISSUE at 09:09

## (undated) DEVICE — MSK ENDO PORT O2 POM ELITE CURAPLEX A/

## (undated) DEVICE — ADAPT CLN BIOGUARD AIR/H2O DISP

## (undated) DEVICE — TBG ARTHSCP PUMP W CONN/REDUC 8FT

## (undated) DEVICE — BITEBLOCK OMNI BLOC

## (undated) DEVICE — SINGLE-USE BIOPSY FORCEPS: Brand: RADIAL JAW 4

## (undated) DEVICE — LN SMPL CO2 SHTRM SD STREAM W/M LUER

## (undated) DEVICE — SKIN PREP TRAY W/CHG: Brand: MEDLINE INDUSTRIES, INC.

## (undated) DEVICE — THE DISPOSABLE RAPTOR GRASPING DEVICE IS USED TO GRASP TISSUE AND/OR RETRIEVE FOREIGN BODIES, EXCISED TISSUE AND STENTS DURING ENDOSCOPIC PROCEDURES.: Brand: RAPTOR

## (undated) DEVICE — KT ORCA ORCAPOD DISP STRL

## (undated) DEVICE — GLV SURG BIOGEL LTX PF 6 1/2

## (undated) DEVICE — DISPOSABLE TOURNIQUET CUFF SINGLE BLADDER, SINGLE PORT AND QUICK CONNECT CONNECTOR: Brand: COLOR CUFF

## (undated) DEVICE — UNDERCAST PADDING: Brand: DEROYAL

## (undated) DEVICE — PK ARTHSCP 40

## (undated) DEVICE — CANN O2 ETCO2 FITS ALL CONN CO2 SMPL A/ 7IN DISP LF

## (undated) DEVICE — DRSNG WND GZ CURAD OIL EMULSION 3X3IN STRL

## (undated) DEVICE — TUBING, SUCTION, 1/4" X 10', STRAIGHT: Brand: MEDLINE

## (undated) DEVICE — BLD DISSCT COOL CUT SJ CRVD 4MM 13CM

## (undated) DEVICE — SENSR O2 OXIMAX FNGR A/ 18IN NONSTR

## (undated) DEVICE — ABL APOLLO RF M/PRT 50D

## (undated) DEVICE — SUT ETHLN 3/0 PS1 18IN 1663H

## (undated) DEVICE — BNDG ELAS ELITE V/CLOSE 4IN 5YD LF STRL

## (undated) DEVICE — FRCP BX RADJAW4 NDL 2.8 240CM LG OG BX40